# Patient Record
Sex: FEMALE | Race: WHITE | NOT HISPANIC OR LATINO | URBAN - METROPOLITAN AREA
[De-identification: names, ages, dates, MRNs, and addresses within clinical notes are randomized per-mention and may not be internally consistent; named-entity substitution may affect disease eponyms.]

---

## 2018-08-03 ENCOUNTER — CONSULTATION (OUTPATIENT)
Dept: URBAN - METROPOLITAN AREA CLINIC 14 | Facility: CLINIC | Age: 73
End: 2018-08-03

## 2018-08-03 DIAGNOSIS — H35.463: ICD-10-CM

## 2018-08-03 DIAGNOSIS — H04.123: ICD-10-CM

## 2018-08-03 DIAGNOSIS — H43.392: ICD-10-CM

## 2018-08-03 DIAGNOSIS — H35.033: ICD-10-CM

## 2018-08-03 PROCEDURE — 92225 OPHTHALMOSCOPY (INITIAL): CPT

## 2018-08-03 PROCEDURE — G8427 DOCREV CUR MEDS BY ELIG CLIN: HCPCS

## 2018-08-03 PROCEDURE — 1036F TOBACCO NON-USER: CPT

## 2018-08-03 PROCEDURE — 92134 CPTRZ OPH DX IMG PST SGM RTA: CPT

## 2018-08-03 PROCEDURE — 4040F PNEUMOC VAC/ADMIN/RCVD: CPT

## 2018-08-03 PROCEDURE — 99244 OFF/OP CNSLTJ NEW/EST MOD 40: CPT

## 2018-08-03 ASSESSMENT — TONOMETRY
OS_IOP_MMHG: 15
OD_IOP_MMHG: 16

## 2018-08-03 ASSESSMENT — VISUAL ACUITY
OS_SC: 20/30-2
OD_PH: 20/30-1
OD_SC: 20/40
OS_PH: 20/25

## 2021-04-08 DIAGNOSIS — Z23 ENCOUNTER FOR IMMUNIZATION: ICD-10-CM

## 2023-04-03 ENCOUNTER — EVALUATION (OUTPATIENT)
Dept: PHYSICAL THERAPY | Facility: CLINIC | Age: 78
End: 2023-04-03

## 2023-04-03 DIAGNOSIS — M54.31 SCIATIC PAIN, RIGHT: Primary | ICD-10-CM

## 2023-04-03 NOTE — PROGRESS NOTES
PT Evaluation     Today's date: 4/3/2023  Patient name: Janette Upton  : 1945  MRN: [id number]  Referring provider: Crow Mercado MD  Dx:   Encounter Diagnosis     ICD-10-CM    1  Sciatic pain, right  M54 31                      Assessment  Assessment details: Janette Upton is a 70 y o  female with history of lumbar surgery, total knee arthroplasty, and neuropathy in both feet, who presents to hospital-based outpatient PT with chronic pain with radicular symptoms to either LE, right > left  Patient presents with the following impairments: low back pain, right-sided radicular symptoms to the ankle, limited lumbar AROM, limited hip strength, limited core neuromuscular activation, poor posture, and poor balance  Due to these impairments, patient has difficulty performing the following: ADL's, recreational activities, engaging in social activities, ambulation, stair negotiation, lifting/carrying, transfers, self-care activities, prolonged standing, bed mobility, lying prone, squatting, bending forward, household chores, getting dressed, shopping, walking around the house, and walking in the community  Patient has been educated in home exercise program and plan of care  Patient was educated that she may benefit from neurological-based outpatient PT due to recent history of falls and poor balance since lumbar surgery  Patient would benefit from skilled physical therapy services to address the above functional limitations and progress towards prior level of function and independence with home exercise program   Impairments: abnormal gait, abnormal muscle firing, abnormal or restricted ROM, activity intolerance, impaired balance, impaired physical strength, lacks appropriate home exercise program, pain with function, poor posture  and poor body mechanics  Understanding of Dx/Px/POC: good   Prognosis: good    Goals  Short Term Goals (3 weeks; target date: 5/15/23)  1   Patient will be independent with initial "HEP   2  Patient will demonstrate an increase in lumbar extension AROM by 10%  3  Patient will demonstrate an increase in B/L LE strength of 1/2 grade on MMT  Long Term Goals (6 weeks; target date: 7/3/23)  1  Patient will be independent with comprehensive HEP  2  Patient will demonstrate an increase in lumbar AROM to WNL in order to promote self-care activities pain-free  3  Patient will be able to ascend one 8\" step with use of SPC with either LE  4  Patient will be able to lift a 5 lb  object from floor height to waist height with SPC with proper mechanics  5  Patient will be able to ambulate 300 ft on even/uneven surfaces with use of SPC      Plan  Patient would benefit from: skilled physical therapy  Planned modality interventions: cryotherapy, electrical stimulation/Russian stimulation, TENS, ultrasound, thermotherapy: hydrocollator packs, traction, high voltage pulsed current: spasm management and high voltage pulsed current: pain management  Planned therapy interventions: flexibility, functional ROM exercises, graded exercise, home exercise program, joint mobilization, manual therapy, neuromuscular re-education, patient education, strengthening, stretching, therapeutic exercise, therapeutic activities, balance/weight bearing training, gait training, abdominal trunk stabilization, self care, postural training, activity modification, ADL retraining, ADL training, balance, behavior modification, body mechanics training, breathing training, therapeutic training, transfer training, graded activity, graded motor, muscle pump exercises, Contreras taping and IADL retraining  Frequency: 2x week  Duration in weeks: 6  Plan of Care beginning date: 4/3/2023  Plan of Care expiration date: 7/3/2023  Treatment plan discussed with: patient        Subjective Evaluation    History of Present Illness  Mechanism of injury: Pt reports that one month ago, while walking outside of her home, she tripped on a piece of " "concrete that was sticking up  Pt states that she tripped and fell on the left side of her face, which bruised her left eye  Pt denies LOC, bleeding, or any other injury  Pt states that she was previously using a cane prior to her fall  Pt states that two weeks ago, she got caught walking between a table and chair while carrying an object in her home  Pt states that she fell forward onto her knees and onto her right side  Pt states that she sustained two scrapes on her knees  Pt denies hitting her head at this time  Pt states that she saw her PCP and was referred to PT  Pt states that she has a history of back surgery \"during the pandemic\" and is unable to identify the specific surgery that was performed  Pt states that it involved a plate and screws  Pt states that she had to go back a few months later due to the shifting of a plate and a screw had fallen out  Pt attributes her balance problems to this surgery  Pt states that the pain can radiate down the right leg and has radiated to the ankle before  Pt states she has recently had symptoms into the left leg that do not pass the knee     Pain  Current pain ratin  At best pain rating: 3  At worst pain ratin  Location: Right lumbar, right leg   Quality: radiating and sharp  Relieving factors: heat  Aggravating factors: standing, sitting, walking and lifting  Progression: no change    Social Support  Steps to enter house: yes  1  Stairs in house: no   Lives with: alone    Employment status: not working (Retired)  Hand dominance: right  Exercise history: Not much       Diagnostic Tests  No diagnostic tests performed  Treatments  Previous treatment: medication  Patient Goals  Patient goals for therapy: decreased pain  Patient goal: To walk without dependence on a cane or walker         Objective     Neurological Testing  Light Touch Sensation - intact throughout BLE, with the exception of hyposensation in the lateral knee    Lumbar AROM     Flexion  Fingertip " reach to malleoli   Extension 30% with central LBP   L side glide 60% with LBP   R side glide 50% with increased RLE pain   L rotation 60%   R rotation  60%     Repeated motions Position Repetitions performed Symptomatic response during Symptomatic response after   Flexion Standing 10   No effect No effect   Extension Standing 10 Increased LBP No worse     Comments: Pt noted improvement in LBP while performing standing lumbar extension; prone lying attempted, but pt presented with difficulty performing independently    Lower Extremity Strength Testing    Hip MMT  Left  Right   Flexion  4/5 4-/5    Abduction 4/5 4/5   Adduction 4/5 4/5     Knee MMT  Left  Right   Flexion  5/5 5/5    Extension 5/5 5/5     Ankle MMT  Left  Right   Dorsiflexion  4/5 5/5    Plantarflexion 5/5 5/5           Insurance:  AMA/CMS Eval/ Re-eval POC expires FOTO Co-Insurance   CMS - [facility] 4/3/23 7/3/23                                      1   4/3 2   3   4   5   6     7   8   9   10   11   12     13   14   15   16   17   18     19   20  21  22  23  24     25   26  27  28  29  30    31  32   33  34  35  36         Precautions: Hx of lumbar (fusion?) surgery ~2020, hx of L TKA, neuropathy in both feet, FALL RISK       EVAL 2 3 4 5 6   Manuals 4/3/23        STM/MFR         Manual flexibility          Joint mobs          Neuro Re-Ed         TA activation         Hip add iso          Bridging         Clamshells                                    Ther Ex         Prone lying Attempted; unable        DERRELL 10x with RW        Seated sciatic nerve glide 10x B/L                                                      Ther Activity         Pt education POC, HEP        Stairs         Squats         Gait                                    Modalities

## 2023-04-03 NOTE — LETTER
April 3, 2023    Kirk Joshi [address]    Patient: Jessica Loera   YOB: 1945   Date of Visit: 4/3/2023     Encounter Diagnosis     ICD-10-CM    1  Sciatic pain, right  M54 31           Dear Dr Shayla Corral: Thank you for your recent referral of Jessica Loera  Please review the attached evaluation summary from Maria Elena's recent visit  Please verify that you agree with the plan of care by signing the attached order  If you have any questions or concerns, please do not hesitate to call  I sincerely appreciate the opportunity to share in the care of one of your patients and hope to have another opportunity to work with you in the near future  Sincerely,    Stephen Olivo      Referring Provider:      I certify that I have read the below Plan of Care and certify the need for these services furnished under this plan of treatment while under my care  Kirk Joshi MD  [address] [place] [address]  Via Fax: [phone]          PT Evaluation     Today's date: 4/3/2023  Patient name: Jessica Loera  : 1945  MRN: [id number]  Referring provider: Jennifer Dahl MD  Dx:   Encounter Diagnosis     ICD-10-CM    1  Sciatic pain, right  M54 31                      Assessment  Assessment details: Jessica Loera is a 70 y o  female with history of lumbar surgery, total knee arthroplasty, and neuropathy in both feet, who presents to hospital-based outpatient PT with chronic pain with radicular symptoms to either LE, right > left  Patient presents with the following impairments: low back pain, right-sided radicular symptoms to the ankle, limited lumbar AROM, limited hip strength, limited core neuromuscular activation, poor posture, and poor balance   Due to these impairments, patient has difficulty performing the following: ADL's, recreational activities, engaging in social activities, ambulation, stair negotiation, lifting/carrying, transfers, "self-care activities, prolonged standing, bed mobility, lying prone, squatting, bending forward, household chores, getting dressed, shopping, walking around the house, and walking in the community  Patient has been educated in home exercise program and plan of care  Patient was educated that she may benefit from neurological-based outpatient PT due to recent history of falls and poor balance since lumbar surgery  Patient would benefit from skilled physical therapy services to address the above functional limitations and progress towards prior level of function and independence with home exercise program   Impairments: abnormal gait, abnormal muscle firing, abnormal or restricted ROM, activity intolerance, impaired balance, impaired physical strength, lacks appropriate home exercise program, pain with function, poor posture  and poor body mechanics  Understanding of Dx/Px/POC: good   Prognosis: good    Goals  Short Term Goals (3 weeks; target date: 5/15/23)  1  Patient will be independent with initial HEP  2  Patient will demonstrate an increase in lumbar extension AROM by 10%  3  Patient will demonstrate an increase in B/L LE strength of 1/2 grade on MMT  Long Term Goals (6 weeks; target date: 7/3/23)  1  Patient will be independent with comprehensive HEP  2  Patient will demonstrate an increase in lumbar AROM to WNL in order to promote self-care activities pain-free  3  Patient will be able to ascend one 8\" step with use of SPC with either LE  4  Patient will be able to lift a 5 lb  object from floor height to waist height with SPC with proper mechanics  5  Patient will be able to ambulate 300 ft on even/uneven surfaces with use of SPC      Plan  Patient would benefit from: skilled physical therapy  Planned modality interventions: cryotherapy, electrical stimulation/Russian stimulation, TENS, ultrasound, thermotherapy: hydrocollator packs, traction, high voltage pulsed current: spasm management and high " "voltage pulsed current: pain management  Planned therapy interventions: flexibility, functional ROM exercises, graded exercise, home exercise program, joint mobilization, manual therapy, neuromuscular re-education, patient education, strengthening, stretching, therapeutic exercise, therapeutic activities, balance/weight bearing training, gait training, abdominal trunk stabilization, self care, postural training, activity modification, ADL retraining, ADL training, balance, behavior modification, body mechanics training, breathing training, therapeutic training, transfer training, graded activity, graded motor, muscle pump exercises, Contreras taping and IADL retraining  Frequency: 2x week  Duration in weeks: 6  Plan of Care beginning date: 4/3/2023  Plan of Care expiration date: 7/3/2023  Treatment plan discussed with: patient        Subjective Evaluation    History of Present Illness  Mechanism of injury: Pt reports that one month ago, while walking outside of her home, she tripped on a piece of concrete that was sticking up  Pt states that she tripped and fell on the left side of her face, which bruised her left eye  Pt denies LOC, bleeding, or any other injury  Pt states that she was previously using a cane prior to her fall  Pt states that two weeks ago, she got caught walking between a table and chair while carrying an object in her home  Pt states that she fell forward onto her knees and onto her right side  Pt states that she sustained two scrapes on her knees  Pt denies hitting her head at this time  Pt states that she saw her PCP and was referred to PT  Pt states that she has a history of back surgery \"during the pandemic\" and is unable to identify the specific surgery that was performed  Pt states that it involved a plate and screws  Pt states that she had to go back a few months later due to the shifting of a plate and a screw had fallen out  Pt attributes her balance problems to this surgery   Pt states " that the pain can radiate down the right leg and has radiated to the ankle before  Pt states she has recently had symptoms into the left leg that do not pass the knee     Pain  Current pain ratin  At best pain rating: 3  At worst pain ratin  Location: Right lumbar, right leg   Quality: radiating and sharp  Relieving factors: heat  Aggravating factors: standing, sitting, walking and lifting  Progression: no change    Social Support  Steps to enter house: yes  1  Stairs in house: no   Lives with: alone    Employment status: not working (Retired)  Hand dominance: right  Exercise history: Not much       Diagnostic Tests  No diagnostic tests performed  Treatments  Previous treatment: medication  Patient Goals  Patient goals for therapy: decreased pain  Patient goal: To walk without dependence on a cane or walker         Objective     Neurological Testing  Light Touch Sensation - intact throughout BLE, with the exception of hyposensation in the lateral knee    Lumbar AROM     Flexion  Fingertip reach to malleoli   Extension 30% with central LBP   L side glide 60% with LBP   R side glide 50% with increased RLE pain   L rotation 60%   R rotation  60%     Repeated motions Position Repetitions performed Symptomatic response during Symptomatic response after   Flexion Standing 10   No effect No effect   Extension Standing 10 Increased LBP No worse     Comments: Pt noted improvement in LBP while performing standing lumbar extension; prone lying attempted, but pt presented with difficulty performing independently    Lower Extremity Strength Testing    Hip MMT  Left  Right   Flexion  4/5 4-/5    Abduction 4/5 4/5   Adduction 4/5 4/5     Knee MMT  Left  Right   Flexion  5/5 5/5    Extension 5/5 5/5     Ankle MMT  Left  Right   Dorsiflexion  4/5 5/5    Plantarflexion 5/5 5/5          Insurance:  AMA/CMS Eval/ Re-eval POC expires FOTO Co-Insurance   CMS - [facility] 4/3/23 7/3/23                                      1   4/3 2   3  4   5   6     7   8   9   10   11   12     13   14   15   16   17   18     19   20  21  22  23  24     25   26  27  28  29  30    31  32   33  34  35  36         Precautions: Hx of lumbar (fusion?) surgery ~2020, hx of L TKA, neuropathy in both feet, FALL RISK       EVAL 2 3 4 5 6   Manuals 4/3/23        STM/MFR         Manual flexibility          Joint mobs          Neuro Re-Ed         TA activation         Hip add iso          Bridging         Clamshells                                    Ther Ex         Prone lying Attempted; unable        DERRELL 10x with RW                                                              Ther Activity         Pt education POC, HEP        Stairs         Squats         Gait                                    Modalities

## 2023-04-05 ENCOUNTER — OFFICE VISIT (OUTPATIENT)
Dept: PHYSICAL THERAPY | Facility: CLINIC | Age: 78
End: 2023-04-05

## 2023-04-05 DIAGNOSIS — M54.31 SCIATIC PAIN, RIGHT: Primary | ICD-10-CM

## 2023-04-05 NOTE — PROGRESS NOTES
Daily Note      Today's date: 2023  Patient name: Loretta Vizcarra  : 1945  MRN: [id number]  Referring provider: Alfredo Curry MD  Dx:   Encounter Diagnosis     ICD-10-CM    1  Sciatic pain, right  M54 31           Subjective: Pt reports that her sciatic symptoms are a little better compared to first day of PT  Pt states that she is able to perform her exercises without any issues  Objective: See treatment diary below    Assessment: Pt tolerated treatment well  Pt demonstrated good carryover of initial HEP  Pt performed DERRELL holding onto RW and plinth behind  Pt verbalized limited confidence with backward bending due to balance  Pt was able to perform rows with RW in front without any issue, but noted lightheadedness after this exercise  Pt demonstrated the ability to consistently contract TA and incorporated it into other hip strengthening exercises well  Plan: Continue per plan of care  Progress treatment as tolerated            Insurance:  AMA/CMS Eval/ Re-eval POC expires FOTO Co-Insurance   CMS - [facility] 4/3/23 7/3/23                                      1   4/3 2     3   4   5   6     7   8   9   10   11   12     13   14   15   16   17   18     19   20  21  22  23  24     25   26  27  28  29  30    31  32   33  34  35  36         Precautions: Hx of lumbar (fusion?) surgery ~, hx of L TKA, neuropathy in both feet, colostomy, FALL RISK       EVAL 2 3 4 5 6   Manuals 4/3/23 4/5/23       STM/MFR         Manual flexibility          Joint mobs          Neuro Re-Ed         TA activation  2x10 (3s) w/ self-palpation       Hip add iso   2x10 (3s)        Glute sets  2x10 (3s)        Clamshells  Supine 20x GTB       Supine march   15x B/L        Rows  W/ RW in front 2x10 4#                Ther Ex         Prone lying Attempted; unable        DERRELL 10x with RW 10x w/ RW       Seated sciatic nerve glide 10x B/L  20x B/L        HS stretching  3x10s B/L w/ SOS Ther Activity         Pt education POC, HEP Reviewed HEP       Stairs         Squats         Gait                                    Modalities

## 2023-04-24 ENCOUNTER — OFFICE VISIT (OUTPATIENT)
Dept: PHYSICAL THERAPY | Facility: CLINIC | Age: 78
End: 2023-04-24

## 2023-04-24 DIAGNOSIS — M54.31 SCIATIC PAIN, RIGHT: Primary | ICD-10-CM

## 2023-04-24 NOTE — PROGRESS NOTES
Daily Note      Today's date: 2023  Patient name: Pranay Tsai  : 1945  MRN: [id number]  Referring provider: Blessing Jimenez MD  Dx:   Encounter Diagnosis     ICD-10-CM    1  Sciatic pain, right  M54 31           Subjective: Pt reports that she is feeling better after being sick last week, but her back has not improved  Pt states that she feels a little better after taking muscle relaxants, prescribed by her PCP  Pt states that she had an x-ray taken and has not yet discussed results with her PCP as he is away  Pt states that she was read the report stating that there is arthritis throughout her back  Pt states that she has not yet followed up with her PCP in the office yet and is waiting for him to return  Objective: See treatment diary below    Assessment: Pt tolerated treatment fair  Pt continues to note pain and difficulty with supine to sit and sit to supine, requiring minor assist from PT  Pt trialed DKTC and LTR and noted difficulty with this exercise, without change in low back pain or sciatic symptoms  Pt also introduced to standing hip abduction and extension and required verbal cuing in order to decrease trunk compensation  Pt presented with severe tissue tension on R QL and lumbar paraspinals and slightly less tissue tension on L  Pt noted TTP on the R side and no TTP on the L  Educated pt that due to missed appointments, ability to assess progress has been limited at this time  Plan to continue PT over the next 2 weeks and reassessment in 1-2 weeks  Plan: Continue per plan of care  Progress treatment as tolerated            Insurance:  AMA/CMS Eval/ Re-eval POC expires FOTO Co-Insurance   CMS - [facility] 4/3/23 7/3/23                                      1   4/3 2     3    4/10 4     5     6     7   8   9   10   11   12     13   14   15   16   17   18     19   20  21  22  23  24     25   26  27  28  29  30    31  32   33  34  35  36          Precautions: Hx of lumbar (fusion?) surgery ~2020, hx of L TKA, neuropathy in both feet, colostomy, FALL RISK       EVAL 2 3 4 5 6   Manuals 4/3/23 4/5/23 4/10/23 4/12/23 4/24/23    STM/MFR     B/L QL, lumbar paraspinals    Manual flexibility          Joint mobs          Neuro Re-Ed         TA activation  2x10 (3s) w/ self-palpation       Hip add iso   2x10 (3s)        Glute sets  2x10 (3s)        Clamshells  Supine 20x GTB   Standing hip abd and ext 10x ea   B/L     Supine march   15x B/L        Rows  W/ RW in front 2x10 4#                Ther Ex         Prone lying Attempted; unable        DERRELL 10x with RW 10x w/ RW 10x w/ RW 10x w/ RW  10x in front of plinth    Seated sciatic nerve glide 10x B/L  20x B/L  20x B/L  Reviewed     HS stretching  3x10s B/L w/ SOS 3x10s B/L w/ SOS      DKTC     2x10 (5s)     LTR     15x B/L                       Ther Activity         Pt education POC, HEP Reviewed HEP Interrupting prolonged sitting with standing and DERRELL Communication with MD Reviewed POC     Stairs         Squats         Gait                                    Modalities         MHP lumbar   Supine 10' pre-tx  Supine 10' post-tx Supine 10' total, 5 min during exercise    Lumbar mechanical traction    10' intermittent supine 30-70#

## 2023-04-26 ENCOUNTER — APPOINTMENT (OUTPATIENT)
Dept: PHYSICAL THERAPY | Facility: CLINIC | Age: 78
End: 2023-04-26

## 2023-05-02 ENCOUNTER — EVALUATION (OUTPATIENT)
Dept: PHYSICAL THERAPY | Facility: CLINIC | Age: 78
End: 2023-05-02

## 2023-05-02 DIAGNOSIS — M54.31 SCIATIC PAIN, RIGHT: Primary | ICD-10-CM

## 2023-05-02 NOTE — PROGRESS NOTES
PT Re-Evaluation     Today's date: 2023  Patient name: Marcia James  : 1945  MRN: [id number]  Referring provider: Jazmyn Griffith MD  Dx:   Encounter Diagnosis     ICD-10-CM    1  Sciatic pain, right  M54 31                      Assessment  Assessment details: Marcia James has been seen for 6 visits in hospital-based outpatient PT with chronic low back pain with radicular symptoms to either LE, left more often than right recently  Patient also has a history of lumbar surgery in 2019 in which hardware was placed and a second surgery a few days later after hardware had loosened  Patient demonstrates limited progress toward short-term and long-term physical therapy goals  Patient continues to present with the following impairments: low back pain, radicular symptoms to either LE, limited lumbar AROM, limited hip strength, limited core neuromuscular activation, poor posture, and poor balance  Due to these impairments, patient continues to have difficulty performing the following: ADL's, recreational activities, engaging in social activities, ambulation, stair negotiation, lifting/carrying, transfers, self-care activities, prolonged standing, bed mobility, lying prone, squatting, bending forward, household chores, getting dressed, shopping, walking around the house, and walking in the community  Patient has been educated in updated plan of care  Patient would benefit from continued skilled physical therapy services in order to establish comprehensive HEP  Patient was advised to establish care with orthopedic/spine specialist as physical therapy will be held after next visit due to limited progress to date    Impairments: abnormal gait, abnormal muscle firing, abnormal or restricted ROM, activity intolerance, impaired balance, impaired physical strength, lacks appropriate home exercise program, pain with function, poor posture  and poor body mechanics  Understanding of Dx/Px/POC: good   Prognosis: "good    Goals  Short Term Goals (3 weeks; target date: 5/15/23)  1  Patient will be independent with initial HEP  - GOAL MET  2  Patient will demonstrate an increase in lumbar extension AROM by 10%  - GOAL MET  3  Patient will demonstrate an increase in B/L LE strength of 1/2 grade on MMT  - in progress     Long Term Goals (6 weeks; target date: 7/3/23)  1  Patient will be independent with comprehensive HEP  - in progress   2  Patient will demonstrate an increase in lumbar AROM to WNL in order to promote self-care activities pain-free  - in progress   3  Patient will be able to ascend one 8\" step with use of SPC with either LE  - in progress   4  Patient will be able to lift a 5 lb  object from floor height to waist height with SPC with proper mechanics  - in progress   5   Patient will be able to ambulate 300 ft on even/uneven surfaces with use of SPC  - in progress     Plan  Patient would benefit from: skilled physical therapy  Planned modality interventions: cryotherapy, electrical stimulation/Russian stimulation, TENS, ultrasound, thermotherapy: hydrocollator packs, traction, high voltage pulsed current: spasm management and high voltage pulsed current: pain management  Planned therapy interventions: flexibility, functional ROM exercises, graded exercise, home exercise program, joint mobilization, manual therapy, neuromuscular re-education, patient education, strengthening, stretching, therapeutic exercise, therapeutic activities, balance/weight bearing training, gait training, abdominal trunk stabilization, self care, postural training, activity modification, ADL retraining, ADL training, balance, behavior modification, body mechanics training, breathing training, therapeutic training, transfer training, graded activity, graded motor, muscle pump exercises, Contreras taping and IADL retraining  Frequency: 2x week  Duration in weeks: 2  Plan of Care beginning date: 5/2/2023  Plan of Care expiration date: " "7/3/2023  Treatment plan discussed with: patient        Subjective Evaluation    History of Present Illness  Mechanism of injury: Pt reports that one month ago, while walking outside of her home, she tripped on a piece of concrete that was sticking up  Pt states that she tripped and fell on the left side of her face, which bruised her left eye  Pt denies LOC, bleeding, or any other injury  Pt states that she was previously using a cane prior to her fall  Pt states that two weeks ago, she got caught walking between a table and chair while carrying an object in her home  Pt states that she fell forward onto her knees and onto her right side  Pt states that she sustained two scrapes on her knees  Pt denies hitting her head at this time  Pt states that she saw her PCP and was referred to PT  Pt states that she has a history of back surgery \"during the pandemic\" and is unable to identify the specific surgery that was performed  Pt states that it involved a plate and screws  Pt states that she had to go back a few months later due to the shifting of a plate and a screw had fallen out  Pt attributes her balance problems to this surgery  Pt states that the pain can radiate down the right leg and has radiated to the ankle before  Pt states she has recently had symptoms into the left leg that do not pass the knee     (23) Pt reports minimal progress since starting PT  Pt states that she still has difficulty with walking and with carrying objects  Pt states that she still has pain radiating into the right leg, but the left leg is more of a problem now  Pt states that she has to use the walker rather than a cane     Pain  Current pain ratin  At best pain ratin  At worst pain rating: 10  Location: Right lumbar, right leg   Quality: radiating, sharp and throbbing  Relieving factors: heat  Aggravating factors: standing, sitting, walking and lifting  Progression: no change    Social Support  Steps to enter house: " yes  1  Stairs in house: no   Lives with: alone    Employment status: not working (Retired)  Hand dominance: right  Exercise history: Not much       Diagnostic Tests  No diagnostic tests performed  Treatments  Previous treatment: medication  Patient Goals  Patient goals for therapy: decreased pain  Patient goal: To walk without dependence on a cane or walker         Objective     Neurological Testing  Light Touch Sensation - intact throughout BLE, with the exception of hyposensation in the lateral knee    Lumbar AROM     Flexion  Fingertip reach to mid shins with L LBP    Extension 30% with L LBP   L side glide Unable *L LBP   R side glide Unable *L LBP   L rotation Unable   R rotation Unable     Repeated motions Position Repetitions performed Symptomatic response during Symptomatic response after   Flexion Standing 10   Increases L LBP Worse   Extension Standing 10 Increases L LBP Worse     Comments: Pt noted increased L LBP with returning to start from flexed position; pt notes increased pain from attempting to side glide and was unable to perform all other motions after     Lower Extremity Strength Testing    Hip MMT  Left  Right   Flexion  3+/5 4/5    Abduction 4/5 4/5   Adduction 4/5 4/5     Knee MMT  Left  Right   Flexion  5/5 5/5    Extension 5/5 5/5     Ankle MMT  Left  Right   Dorsiflexion  3+5 5/5    Plantarflexion 5/5 5/5                  Insurance:  AMA/CMS Eval/ Re-eval POC expires FOTO Co-Insurance   CMS - [facility] 4/3/23 7/3/23                                      1   4/3 2    4/5 3    4/10 4    4/12 5    4/24 6     5/2 RE   7   8   9   10   11   12     13   14   15   16   17   18     19   20  21  22  23  24     25   26  27  28  29  30    31  32   33  34  35  36          Precautions: Hx of lumbar (fusion?) surgery ~2020, hx of L TKA, neuropathy in both feet, colostomy, FALL RISK       3 4 5 6 (RE-EVAL) 7    Manuals 4/10/23 4/12/23 4/24/23 5/2/23     STM/MFR   B/L QL, lumbar paraspinals B/L QL, lumbar paraspinals while seated     Manual flexibility          Joint mobs          Neuro Re-Ed         TA activation         Hip add iso          Glute sets         Clamshells   Standing hip abd and ext 10x ea   B/L       Supine march          Rows                  Ther Ex         Prone lying    Reassessment of ROM and strength x 30'      DERRELL 10x w/ RW 10x w/ RW  10x in front of plinth      Seated sciatic nerve glide 20x B/L  Reviewed       HS stretching 3x10s B/L w/ SOS        DKTC   2x10 (5s)       LTR   15x B/L                         Ther Activity         Pt education Interrupting prolonged sitting with standing and DERRELL Communication with MD Reviewed POC  Reviewed POC; recommended establishing care with orthopedics/ spine specialist     Stairs         Squats         Gait                                    Modalities         MHP lumbar Supine 10' pre-tx  Supine 10' post-tx Supine 10' total, 5 min during exercise Seated 5 min pre-tx     Lumbar mechanical traction  10' intermittent supine 30-70#

## 2023-05-02 NOTE — LETTER
May 2, 2023    Rich Reynolds [address]    Patient: Geoffrey Quinn   YOB: 1945   Date of Visit: 2023     Encounter Diagnosis     ICD-10-CM    1  Sciatic pain, right  M54 31           Dear Dr Chino Hiss: Thank you for your recent referral of Geoffrey Quinn  Please review the attached evaluation summary from Maria Elena's recent visit  Please verify that you agree with the plan of care by signing the attached order  If you have any questions or concerns, please do not hesitate to call  I sincerely appreciate the opportunity to share in the care of one of your patients and hope to have another opportunity to work with you in the near future  Sincerely,    Rohit Worrell      Referring Provider:      I certify that I have read the below Plan of Care and certify the need for these services furnished under this plan of treatment while under my care  Rich Reynolds MD  [address] [place] [address]  Via Fax: [phone]          PT Re-Evaluation     Today's date: 2023  Patient name: Geoffrey Quinn  : 1945  MRN: [id number]  Referring provider: Supa Huntley MD  Dx:   Encounter Diagnosis     ICD-10-CM    1  Sciatic pain, right  M54 31                      Assessment  Assessment details: Geoffrey Quinn has been seen for 6 visits in hospital-based outpatient PT with chronic low back pain with radicular symptoms to either LE, left more often than right recently  Patient also has a history of lumbar surgery in 2019 in which hardware was placed and a second surgery a few days later after hardware had loosened  Patient demonstrates limited progress toward short-term and long-term physical therapy goals  Patient continues to present with the following impairments: low back pain, radicular symptoms to either LE, limited lumbar AROM, limited hip strength, limited core neuromuscular activation, poor posture, and poor balance   Due to these "impairments, patient continues to have difficulty performing the following: ADL's, recreational activities, engaging in social activities, ambulation, stair negotiation, lifting/carrying, transfers, self-care activities, prolonged standing, bed mobility, lying prone, squatting, bending forward, household chores, getting dressed, shopping, walking around the house, and walking in the community  Patient has been educated in updated plan of care  Patient would benefit from continued skilled physical therapy services in order to establish comprehensive HEP  Patient was advised to establish care with orthopedic/spine specialist as physical therapy will be held after next visit due to limited progress to date  Impairments: abnormal gait, abnormal muscle firing, abnormal or restricted ROM, activity intolerance, impaired balance, impaired physical strength, lacks appropriate home exercise program, pain with function, poor posture  and poor body mechanics  Understanding of Dx/Px/POC: good   Prognosis: good    Goals  Short Term Goals (3 weeks; target date: 5/15/23)  1  Patient will be independent with initial HEP  - GOAL MET  2  Patient will demonstrate an increase in lumbar extension AROM by 10%  - GOAL MET  3  Patient will demonstrate an increase in B/L LE strength of 1/2 grade on MMT  - in progress     Long Term Goals (6 weeks; target date: 7/3/23)  1  Patient will be independent with comprehensive HEP  - in progress   2  Patient will demonstrate an increase in lumbar AROM to WNL in order to promote self-care activities pain-free  - in progress   3  Patient will be able to ascend one 8\" step with use of SPC with either LE  - in progress   4  Patient will be able to lift a 5 lb  object from floor height to waist height with SPC with proper mechanics  - in progress   5   Patient will be able to ambulate 300 ft on even/uneven surfaces with use of SPC  - in progress     Plan  Patient would benefit from: skilled physical " "therapy  Planned modality interventions: cryotherapy, electrical stimulation/Russian stimulation, TENS, ultrasound, thermotherapy: hydrocollator packs, traction, high voltage pulsed current: spasm management and high voltage pulsed current: pain management  Planned therapy interventions: flexibility, functional ROM exercises, graded exercise, home exercise program, joint mobilization, manual therapy, neuromuscular re-education, patient education, strengthening, stretching, therapeutic exercise, therapeutic activities, balance/weight bearing training, gait training, abdominal trunk stabilization, self care, postural training, activity modification, ADL retraining, ADL training, balance, behavior modification, body mechanics training, breathing training, therapeutic training, transfer training, graded activity, graded motor, muscle pump exercises, Contreras taping and IADL retraining  Frequency: 2x week  Duration in weeks: 2  Plan of Care beginning date: 5/2/2023  Plan of Care expiration date: 7/3/2023  Treatment plan discussed with: patient        Subjective Evaluation    History of Present Illness  Mechanism of injury: Pt reports that one month ago, while walking outside of her home, she tripped on a piece of concrete that was sticking up  Pt states that she tripped and fell on the left side of her face, which bruised her left eye  Pt denies LOC, bleeding, or any other injury  Pt states that she was previously using a cane prior to her fall  Pt states that two weeks ago, she got caught walking between a table and chair while carrying an object in her home  Pt states that she fell forward onto her knees and onto her right side  Pt states that she sustained two scrapes on her knees  Pt denies hitting her head at this time  Pt states that she saw her PCP and was referred to PT  Pt states that she has a history of back surgery \"during the pandemic\" and is unable to identify the specific surgery that was performed   Pt " states that it involved a plate and screws  Pt states that she had to go back a few months later due to the shifting of a plate and a screw had fallen out  Pt attributes her balance problems to this surgery  Pt states that the pain can radiate down the right leg and has radiated to the ankle before  Pt states she has recently had symptoms into the left leg that do not pass the knee     (23) Pt reports minimal progress since starting PT  Pt states that she still has difficulty with walking and with carrying objects  Pt states that she still has pain radiating into the right leg, but the left leg is more of a problem now  Pt states that she has to use the walker rather than a cane     Pain  Current pain ratin  At best pain ratin  At worst pain rating: 10  Location: Right lumbar, right leg   Quality: radiating, sharp and throbbing  Relieving factors: heat  Aggravating factors: standing, sitting, walking and lifting  Progression: no change    Social Support  Steps to enter house: yes  1  Stairs in house: no   Lives with: alone    Employment status: not working (Retired)  Hand dominance: right  Exercise history: Not much       Diagnostic Tests  No diagnostic tests performed  Treatments  Previous treatment: medication  Patient Goals  Patient goals for therapy: decreased pain  Patient goal: To walk without dependence on a cane or walker         Objective     Neurological Testing  Light Touch Sensation - intact throughout BLE, with the exception of hyposensation in the lateral knee    Lumbar AROM     Flexion  Fingertip reach to mid shins with L LBP    Extension 30% with L LBP   L side glide Unable *L LBP   R side glide Unable *L LBP   L rotation Unable   R rotation Unable     Repeated motions Position Repetitions performed Symptomatic response during Symptomatic response after   Flexion Standing 10   Increases L LBP Worse   Extension Standing 10 Increases L LBP Worse     Comments: Pt noted increased L LBP with returning to start from flexed position; pt notes increased pain from attempting to side glide and was unable to perform all other motions after     Lower Extremity Strength Testing    Hip MMT  Left  Right   Flexion  3+/5 4/5    Abduction 4/5 4/5   Adduction 4/5 4/5     Knee MMT  Left  Right   Flexion  5/5 5/5    Extension 5/5 5/5     Ankle MMT  Left  Right   Dorsiflexion  3+5 5/5    Plantarflexion 5/5 5/5                 Insurance:  AMA/CMS Eval/ Re-eval POC expires FOTO Co-Insurance   CMS - [facility] 4/3/23 7/3/23                                      1   4/3 2    4/5 3    4/10 4    4/12 5    4/24 6  5/2 RE   7   8   9   10   11   12     13   14   15   16   17   18     19   20  21  22  23  24     25   26  27  28  29  30    31  32   33  34  35  36          Precautions: Hx of lumbar (fusion?) surgery ~2020, hx of L TKA, neuropathy in both feet, colostomy, FALL RISK       3 4 5 6 (RE-EVAL) 7    Manuals 4/10/23 4/12/23 4/24/23 5/2/23     STM/MFR   B/L QL, lumbar paraspinals B/L QL, lumbar paraspinals while seated     Manual flexibility          Joint mobs          Neuro Re-Ed         TA activation         Hip add iso          Glute sets         Clamshells   Standing hip abd and ext 10x ea   B/L       Supine march Rows                  Ther Ex         Prone lying    Reassessment of ROM and strength x 30'      DERRELL 10x w/ RW 10x w/ RW  10x in front of plinth      Seated sciatic nerve glide 20x B/L  Reviewed       HS stretching 3x10s B/L w/ SOS        DKTC   2x10 (5s)       LTR   15x B/L                         Ther Activity         Pt education Interrupting prolonged sitting with standing and DERRELL Communication with MD Reviewed POC  Reviewed POC; recommended establishing care with orthopedics/ spine specialist     Stairs         Squats         Gait                                    Modalities         MHP lumbar Supine 10' pre-tx  Supine 10' post-tx Supine 10' total, 5 min during exercise Seated 5 min pre-tx Lumbar mechanical traction  10' intermittent supine 30-70#

## 2023-05-18 ENCOUNTER — CONSULT (OUTPATIENT)
Dept: PAIN MEDICINE | Facility: CLINIC | Age: 78
End: 2023-05-18

## 2023-05-18 ENCOUNTER — TELEPHONE (OUTPATIENT)
Dept: PAIN MEDICINE | Facility: CLINIC | Age: 78
End: 2023-05-18

## 2023-05-18 VITALS
HEART RATE: 95 BPM | TEMPERATURE: 98 F | DIASTOLIC BLOOD PRESSURE: 75 MMHG | WEIGHT: 186 LBS | SYSTOLIC BLOOD PRESSURE: 146 MMHG

## 2023-05-18 DIAGNOSIS — M46.1 SACROILIITIS (HCC): ICD-10-CM

## 2023-05-18 DIAGNOSIS — M54.41 CHRONIC BILATERAL LOW BACK PAIN WITH BILATERAL SCIATICA: ICD-10-CM

## 2023-05-18 DIAGNOSIS — G89.4 CHRONIC PAIN SYNDROME: Primary | ICD-10-CM

## 2023-05-18 DIAGNOSIS — G89.29 CHRONIC BILATERAL LOW BACK PAIN WITH BILATERAL SCIATICA: ICD-10-CM

## 2023-05-18 DIAGNOSIS — M54.42 CHRONIC BILATERAL LOW BACK PAIN WITH BILATERAL SCIATICA: ICD-10-CM

## 2023-05-18 RX ORDER — CARVEDILOL 12.5 MG/1
12.5 TABLET ORAL 2 TIMES DAILY WITH MEALS
COMMUNITY

## 2023-05-18 RX ORDER — MIRABEGRON 50 MG/1
25 TABLET, FILM COATED, EXTENDED RELEASE ORAL
COMMUNITY
Start: 2023-05-11

## 2023-05-18 RX ORDER — MULTIVITAMIN
1 TABLET ORAL DAILY
COMMUNITY

## 2023-05-18 RX ORDER — MELOXICAM 7.5 MG/1
TABLET ORAL
COMMUNITY
Start: 2023-04-26

## 2023-05-18 RX ORDER — ZOLPIDEM TARTRATE 5 MG/1
TABLET ORAL
COMMUNITY
Start: 2023-03-31

## 2023-05-18 RX ORDER — GABAPENTIN 300 MG/1
CAPSULE ORAL
COMMUNITY
Start: 2023-04-01

## 2023-05-18 RX ORDER — TRAMADOL HYDROCHLORIDE 50 MG/1
TABLET ORAL
COMMUNITY
Start: 2023-05-11

## 2023-05-18 RX ORDER — IRBESARTAN 150 MG/1
TABLET ORAL
COMMUNITY
Start: 2023-04-26

## 2023-05-18 RX ORDER — PANTOPRAZOLE SODIUM 40 MG/1
TABLET, DELAYED RELEASE ORAL
COMMUNITY
Start: 2023-04-26

## 2023-05-18 NOTE — PROGRESS NOTES
Assessment:  1  Chronic pain syndrome    2  Chronic bilateral low back pain with bilateral sciatica    3  Sacroiliitis (HCC)        Plan  New Medications Ordered This Visit   Medications   • prednisoLONE 5 MG (21) TBPK   • meloxicam (MOBIC) 7 5 mg tablet   • traMADol (ULTRAM) 50 mg tablet   • pantoprazole (PROTONIX) 40 mg tablet   • Myrbetriq 50 MG TB24   • gabapentin (NEURONTIN) 300 mg capsule   • irbesartan (AVAPRO) 150 mg tablet   • zolpidem (AMBIEN) 5 mg tablet   • carvedilol (COREG) 12 5 mg tablet     Sig: Take 12 5 mg by mouth 2 (two) times a day with meals   • Multiple Vitamin (multivitamin) tablet     Sig: Take 1 tablet by mouth daily     My impressions and treatment recommendations were discussed in detail with the patient, who verbalized understanding and had no further questions  Given that the patient has signs and symptoms consistent with bilateral sacroiliitis, discussed the rationale of undergoing bilateral sacroiliac joint injections since this could be potentially therapeutic  The procedures, its risks, and benefits were explained in detail to the patient  Risks include but are not limited to bleeding, infection, hematoma formation, abscess formation, weakness, headache, failure the pain to improve, nerve irritation or damage, and potential worsening of the pain  The patient verbalized understanding and wished to proceed with the procedure  Follow-up is planned in 4 weeks time or sooner as warranted  Discharge instructions were provided  I personally saw and examined the patient and I agree with the above discussed plan of care  History of Present Illness:    Jennifer Westfall is a 70 y o  female who presents to [facility] for initial evaluation of the above stated pain complaints  The patient has a past medical and chronic pain history as outlined in the assessment section  She was self-referred      At today's office visit, the patient reports pain primarily overlying the bilateral sacroiliac joints  She describes her pain as severe and 10 out of 10 on the verbal numerical pain rating scale  Her pain is constant in nature  She reports a 3-month history of significant pain in overlying her bilateral sacroiliac joints with radiation in the distribution of the sciatic nerve bilaterally  She reports no typical pattern to her pain  She describes her pain as shooting, numbness, sharp, pressure-like, throbbing, and dull/aching  She reports weakness in her lower extremities  She ambulates with the assistance of a walker  Prayer, lying down, standing, bending, sitting, exercise, relaxation, coughing, sneezing, bowel movements, and menstruation increases pain  There are no pain relieving factors  Physical therapy and heat treatment provides no pain relief  Surgery did provide her moderate pain relief  Tramadol, acetaminophen, and Advil is currently being used  Review of Systems:    Review of Systems   Constitutional: Negative for chills and fatigue  HENT: Negative for ear pain, mouth sores and sinus pressure  Eyes: Negative for pain, redness and visual disturbance  Respiratory: Negative for shortness of breath and wheezing  Cardiovascular: Negative for chest pain and palpitations  Gastrointestinal: Negative for abdominal pain and nausea  Endocrine: Negative for polyphagia  Musculoskeletal: Positive for back pain, gait problem and joint swelling  Negative for arthralgias and neck pain  Decreased ROM   Skin: Negative for wound  Neurological: Positive for dizziness, weakness, light-headedness and numbness  Negative for seizures  Psychiatric/Behavioral: Negative for dysphoric mood and sleep disturbance             Past Medical History:   Diagnosis Date   • Arthritis    • Fibromyalgia, primary        Past Surgical History:   Procedure Laterality Date   • COLON SURGERY     • SPINAL FUSION     • SPINE SURGERY         Family History   Problem Relation Age of Onset   • No Known Problems Mother    • No Known Problems Father        Social History     Occupational History   • Not on file   Tobacco Use   • Smoking status: Never   • Smokeless tobacco: Never   Substance and Sexual Activity   • Alcohol use: Never   • Drug use: Never   • Sexual activity: Never         Current Outpatient Medications:   •  carvedilol (COREG) 12 5 mg tablet, Take 12 5 mg by mouth 2 (two) times a day with meals, Disp: , Rfl:   •  gabapentin (NEURONTIN) 300 mg capsule, , Disp: , Rfl:   •  irbesartan (AVAPRO) 150 mg tablet, , Disp: , Rfl:   •  meloxicam (MOBIC) 7 5 mg tablet, , Disp: , Rfl:   •  Multiple Vitamin (multivitamin) tablet, Take 1 tablet by mouth daily, Disp: , Rfl:   •  Myrbetriq 50 MG TB24, , Disp: , Rfl:   •  pantoprazole (PROTONIX) 40 mg tablet, , Disp: , Rfl:   •  prednisoLONE 5 MG (21) TBPK, , Disp: , Rfl:   •  traMADol (ULTRAM) 50 mg tablet, , Disp: , Rfl:   •  zolpidem (AMBIEN) 5 mg tablet, , Disp: , Rfl:     No Known Allergies    Physical Exam:    /75   Pulse 95   Temp 98 °F (36 7 °C)   Wt 84 4 kg (186 lb)     Constitutional: obese  Eyes: anicteric  HEENT: grossly intact  Neck: supple, symmetric, trachea midline and no masses   Pulmonary:even and unlabored  Cardiovascular:No edema or pitting edema present  Skin:Normal without rashes or lesions and well hydrated  Psychiatric:Mood and affect appropriate  Neurologic:Cranial Nerves II-XII grossly intact  Musculoskeletal:antalgic and ambulates with wheeled walker     Lumbar Spine Exam    Appearance:  Normal lordosis  Palpation/Tenderness:  left sacroiliac joint tenderness  right sacroiliac joint tenderness   CESAR testing is positive bilaterally  Sensory:  no sensory deficits noted  Range of Motion:  Flexion:   Moderately limited  with pain  Extension:  Moderately limited  with pain  Lateral Flexion - Left:  Moderately limited  with pain  Lateral Flexion - Right:  Moderately limited  with pain  Rotation - Left: Moderately limited  with pain  Rotation - Right:  Moderately limited  with pain   Lumbar facet loading is negative bilaterally  Motor Strength:  Left hip flexion:  5/5  Left hip extension:  5/5  Right hip flexion:  5/5  Right hip extension:  5/5  Left knee flexion:  5/5  Left knee extension:  5/5  Right knee flexion:  5/5  Right knee extension:  5/5  Left foot dorsiflexion:  5/5  Left foot plantar flexion:  5/5  Right foot dorsiflexion:  5/5  Right foot plantar flexion:  5/5  Reflexes:  Left Patellar:  2+   Right Patellar:  2+   Left Achilles:  2+   Right Achilles:  2+   Special Tests:  Left Straight Leg Test:  negative  Right Straight Leg Test:  negative  Left Oleg's Maneuver:  positive  Right Oleg's Maneuver:  positive

## 2023-05-18 NOTE — H&P (VIEW-ONLY)
Assessment:  1  Chronic pain syndrome    2  Chronic bilateral low back pain with bilateral sciatica    3  Sacroiliitis (HCC)        Plan  New Medications Ordered This Visit   Medications   • prednisoLONE 5 MG (21) TBPK   • meloxicam (MOBIC) 7 5 mg tablet   • traMADol (ULTRAM) 50 mg tablet   • pantoprazole (PROTONIX) 40 mg tablet   • Myrbetriq 50 MG TB24   • gabapentin (NEURONTIN) 300 mg capsule   • irbesartan (AVAPRO) 150 mg tablet   • zolpidem (AMBIEN) 5 mg tablet   • carvedilol (COREG) 12 5 mg tablet     Sig: Take 12 5 mg by mouth 2 (two) times a day with meals   • Multiple Vitamin (multivitamin) tablet     Sig: Take 1 tablet by mouth daily     My impressions and treatment recommendations were discussed in detail with the patient, who verbalized understanding and had no further questions  Given that the patient has signs and symptoms consistent with bilateral sacroiliitis, discussed the rationale of undergoing bilateral sacroiliac joint injections since this could be potentially therapeutic  The procedures, its risks, and benefits were explained in detail to the patient  Risks include but are not limited to bleeding, infection, hematoma formation, abscess formation, weakness, headache, failure the pain to improve, nerve irritation or damage, and potential worsening of the pain  The patient verbalized understanding and wished to proceed with the procedure  Follow-up is planned in 4 weeks time or sooner as warranted  Discharge instructions were provided  I personally saw and examined the patient and I agree with the above discussed plan of care  History of Present Illness:    Shannan Moran is a 70 y o  female who presents to [facility] for initial evaluation of the above stated pain complaints  The patient has a past medical and chronic pain history as outlined in the assessment section  She was self-referred      At today's office visit, the patient reports pain primarily overlying the bilateral sacroiliac joints  She describes her pain as severe and 10 out of 10 on the verbal numerical pain rating scale  Her pain is constant in nature  She reports a 3-month history of significant pain in overlying her bilateral sacroiliac joints with radiation in the distribution of the sciatic nerve bilaterally  She reports no typical pattern to her pain  She describes her pain as shooting, numbness, sharp, pressure-like, throbbing, and dull/aching  She reports weakness in her lower extremities  She ambulates with the assistance of a walker  Prayer, lying down, standing, bending, sitting, exercise, relaxation, coughing, sneezing, bowel movements, and menstruation increases pain  There are no pain relieving factors  Physical therapy and heat treatment provides no pain relief  Surgery did provide her moderate pain relief  Tramadol, acetaminophen, and Advil is currently being used  Review of Systems:    Review of Systems   Constitutional: Negative for chills and fatigue  HENT: Negative for ear pain, mouth sores and sinus pressure  Eyes: Negative for pain, redness and visual disturbance  Respiratory: Negative for shortness of breath and wheezing  Cardiovascular: Negative for chest pain and palpitations  Gastrointestinal: Negative for abdominal pain and nausea  Endocrine: Negative for polyphagia  Musculoskeletal: Positive for back pain, gait problem and joint swelling  Negative for arthralgias and neck pain  Decreased ROM   Skin: Negative for wound  Neurological: Positive for dizziness, weakness, light-headedness and numbness  Negative for seizures  Psychiatric/Behavioral: Negative for dysphoric mood and sleep disturbance             Past Medical History:   Diagnosis Date   • Arthritis    • Fibromyalgia, primary        Past Surgical History:   Procedure Laterality Date   • COLON SURGERY     • SPINAL FUSION     • SPINE SURGERY         Family History   Problem Relation Age of Onset   • No Known Problems Mother    • No Known Problems Father        Social History     Occupational History   • Not on file   Tobacco Use   • Smoking status: Never   • Smokeless tobacco: Never   Substance and Sexual Activity   • Alcohol use: Never   • Drug use: Never   • Sexual activity: Never         Current Outpatient Medications:   •  carvedilol (COREG) 12 5 mg tablet, Take 12 5 mg by mouth 2 (two) times a day with meals, Disp: , Rfl:   •  gabapentin (NEURONTIN) 300 mg capsule, , Disp: , Rfl:   •  irbesartan (AVAPRO) 150 mg tablet, , Disp: , Rfl:   •  meloxicam (MOBIC) 7 5 mg tablet, , Disp: , Rfl:   •  Multiple Vitamin (multivitamin) tablet, Take 1 tablet by mouth daily, Disp: , Rfl:   •  Myrbetriq 50 MG TB24, , Disp: , Rfl:   •  pantoprazole (PROTONIX) 40 mg tablet, , Disp: , Rfl:   •  prednisoLONE 5 MG (21) TBPK, , Disp: , Rfl:   •  traMADol (ULTRAM) 50 mg tablet, , Disp: , Rfl:   •  zolpidem (AMBIEN) 5 mg tablet, , Disp: , Rfl:     No Known Allergies    Physical Exam:    /75   Pulse 95   Temp 98 °F (36 7 °C)   Wt 84 4 kg (186 lb)     Constitutional: obese  Eyes: anicteric  HEENT: grossly intact  Neck: supple, symmetric, trachea midline and no masses   Pulmonary:even and unlabored  Cardiovascular:No edema or pitting edema present  Skin:Normal without rashes or lesions and well hydrated  Psychiatric:Mood and affect appropriate  Neurologic:Cranial Nerves II-XII grossly intact  Musculoskeletal:antalgic and ambulates with wheeled walker     Lumbar Spine Exam    Appearance:  Normal lordosis  Palpation/Tenderness:  left sacroiliac joint tenderness  right sacroiliac joint tenderness   CESAR testing is positive bilaterally  Sensory:  no sensory deficits noted  Range of Motion:  Flexion:   Moderately limited  with pain  Extension:  Moderately limited  with pain  Lateral Flexion - Left:  Moderately limited  with pain  Lateral Flexion - Right:  Moderately limited  with pain  Rotation - Left: Moderately limited  with pain  Rotation - Right:  Moderately limited  with pain   Lumbar facet loading is negative bilaterally  Motor Strength:  Left hip flexion:  5/5  Left hip extension:  5/5  Right hip flexion:  5/5  Right hip extension:  5/5  Left knee flexion:  5/5  Left knee extension:  5/5  Right knee flexion:  5/5  Right knee extension:  5/5  Left foot dorsiflexion:  5/5  Left foot plantar flexion:  5/5  Right foot dorsiflexion:  5/5  Right foot plantar flexion:  5/5  Reflexes:  Left Patellar:  2+   Right Patellar:  2+   Left Achilles:  2+   Right Achilles:  2+   Special Tests:  Left Straight Leg Test:  negative  Right Straight Leg Test:  negative  Left Oleg's Maneuver:  positive  Right Oleg's Maneuver:  positive

## 2023-05-18 NOTE — TELEPHONE ENCOUNTER
Scheduled pt for [facility] 6/9/23  Went over pre-procedure instructions below:  Nothing to eat or drink 1 hr prior to procedure  Need to arrange transportation  Proper clothing for procedure  No vaccines 2 weeks prior or after procedure  If ill or placed on antibiotics please call to reschedule

## 2023-05-24 ENCOUNTER — HOSPITAL ENCOUNTER (EMERGENCY)
Facility: HOSPITAL | Age: 78
Discharge: HOME | End: 2023-05-24
Attending: EMERGENCY MEDICINE

## 2023-05-24 ENCOUNTER — APPOINTMENT (EMERGENCY)
Dept: RADIOLOGY | Facility: HOSPITAL | Age: 78
End: 2023-05-24

## 2023-05-24 VITALS
WEIGHT: 170 LBS | SYSTOLIC BLOOD PRESSURE: 192 MMHG | DIASTOLIC BLOOD PRESSURE: 76 MMHG | OXYGEN SATURATION: 97 % | TEMPERATURE: 97 F | HEART RATE: 84 BPM | RESPIRATION RATE: 18 BRPM

## 2023-05-24 DIAGNOSIS — S51.819A FOREARM LACERATION: Primary | ICD-10-CM

## 2023-05-24 PROBLEM — M54.30 SCIATICA: Status: ACTIVE | Noted: 2023-05-24

## 2023-05-24 RX ORDER — CANDESARTAN 32 MG/1
32 TABLET ORAL DAILY
COMMUNITY

## 2023-05-24 RX ORDER — PHENOL 1.4 %
600 AEROSOL, SPRAY (ML) MUCOUS MEMBRANE DAILY
COMMUNITY

## 2023-05-24 RX ORDER — CEFUROXIME AXETIL 500 MG/1
500 TABLET ORAL EVERY 12 HOURS SCHEDULED
COMMUNITY

## 2023-05-24 RX ORDER — CEPHALEXIN 500 MG/1
500 CAPSULE ORAL ONCE
Status: COMPLETED | OUTPATIENT
Start: 2023-05-24 | End: 2023-05-24

## 2023-05-24 RX ORDER — GINSENG 100 MG
1 CAPSULE ORAL 2 TIMES DAILY
Qty: 28 G | Refills: 0 | Status: SHIPPED | OUTPATIENT
Start: 2023-05-24

## 2023-05-24 RX ORDER — CEPHALEXIN 500 MG/1
500 CAPSULE ORAL EVERY 8 HOURS SCHEDULED
Qty: 21 CAPSULE | Refills: 0 | Status: SHIPPED | OUTPATIENT
Start: 2023-05-24 | End: 2023-05-31

## 2023-05-24 RX ORDER — GINSENG 100 MG
1 CAPSULE ORAL ONCE
Status: COMPLETED | OUTPATIENT
Start: 2023-05-24 | End: 2023-05-24

## 2023-05-24 RX ORDER — LIDOCAINE HYDROCHLORIDE AND EPINEPHRINE 10; 10 MG/ML; UG/ML
20 INJECTION, SOLUTION INFILTRATION; PERINEURAL ONCE
Status: COMPLETED | OUTPATIENT
Start: 2023-05-24 | End: 2023-05-24

## 2023-05-24 RX ADMIN — TETANUS TOXOID, REDUCED DIPHTHERIA TOXOID AND ACELLULAR PERTUSSIS VACCINE, ADSORBED 0.5 ML: 5; 2.5; 8; 8; 2.5 SUSPENSION INTRAMUSCULAR at 20:14

## 2023-05-24 RX ADMIN — BACITRACIN 1 SMALL APPLICATION: 500 OINTMENT TOPICAL at 20:15

## 2023-05-24 RX ADMIN — CEPHALEXIN 500 MG: 500 CAPSULE ORAL at 21:35

## 2023-05-24 RX ADMIN — LIDOCAINE HYDROCHLORIDE,EPINEPHRINE BITARTRATE 20 ML: 10; .01 INJECTION, SOLUTION INFILTRATION; PERINEURAL at 20:14

## 2023-05-25 NOTE — ED PROVIDER NOTES
History  Chief Complaint   Patient presents with   • Laceration     Pt reports tripping over cane and falling to the ground  Caught arm on door jam  Lac noted to r arm  Denies hitting head and denies loc  Bleeding controlled at this time  Not on thinners     70-year-old female presents with a laceration to her right forearm she caught it on a door jam today and tripped  Denies any head injury loss of consciousness not on blood thinners no other complaints  History provided by:  Patient   used: No        Prior to Admission Medications   Prescriptions Last Dose Informant Patient Reported? Taking?    Multiple Vitamin (multivitamin) tablet 2023  Yes Yes   Sig: Take 1 tablet by mouth daily   Myrbetriq 50 MG   Yes Yes   Si mg   calcium carbonate (OS-HUE) 600 MG tablet 2023  Yes Yes   Sig: Take 600 mg by mouth daily   candesartan (ATACAND) 32 MG tablet 2023  Yes Yes   Sig: Take 32 mg by mouth daily   carvedilol (COREG) 12 5 mg tablet 2023  Yes Yes   Sig: Take 12 5 mg by mouth 2 (two) times a day with meals   cefuroxime (CEFTIN) 500 mg tablet 2023  Yes Yes   Sig: Take 500 mg by mouth every 12 (twelve) hours   gabapentin (NEURONTIN) 300 mg capsule Not Taking  Yes No   Patient not taking: Reported on 2023   irbesartan (AVAPRO) 150 mg tablet 2023  Yes Yes   meloxicam (MOBIC) 7 5 mg tablet 2023  Yes Yes   pantoprazole (PROTONIX) 40 mg tablet 2023  Yes Yes   prednisoLONE 5 MG (21) TBPK 2023  Yes Yes   traMADol (ULTRAM) 50 mg tablet 2023  Yes Yes   zolpidem (AMBIEN) 5 mg tablet 2023  Yes Yes      Facility-Administered Medications: None       Past Medical History:   Diagnosis Date   • Arthritis    • Fibromyalgia, primary    • Sciatica        Past Surgical History:   Procedure Laterality Date   • COLON SURGERY     • SPINAL FUSION     • SPINE SURGERY         Family History   Problem Relation Age of Onset   • No Known Problems Mother    • No Known Problems Father      I have reviewed and agree with the history as documented  E-Cigarette/Vaping     E-Cigarette/Vaping Substances     Social History     Tobacco Use   • Smoking status: Former     Types: Cigarettes   • Smokeless tobacco: Never   Substance Use Topics   • Alcohol use: Never   • Drug use: Never       Review of Systems   Constitutional: Negative  HENT: Negative  Eyes: Negative  Respiratory: Negative  Cardiovascular: Negative  Gastrointestinal: Negative  Endocrine: Negative  Genitourinary: Negative  Musculoskeletal: Negative  Skin: Positive for wound  Allergic/Immunologic: Negative  Neurological: Negative  Hematological: Negative  Psychiatric/Behavioral: Negative  All other systems reviewed and are negative  Physical Exam  Physical Exam  Vitals and nursing note reviewed  Constitutional:       Appearance: Normal appearance  HENT:      Head: Normocephalic and atraumatic  Nose: Nose normal       Mouth/Throat:      Mouth: Mucous membranes are moist    Eyes:      Extraocular Movements: Extraocular movements intact  Pupils: Pupils are equal, round, and reactive to light  Cardiovascular:      Rate and Rhythm: Normal rate and regular rhythm  Pulmonary:      Effort: Pulmonary effort is normal       Breath sounds: Normal breath sounds  Abdominal:      General: Abdomen is flat  Bowel sounds are normal       Palpations: Abdomen is soft  Musculoskeletal:         General: Normal range of motion  Cervical back: Normal range of motion and neck supple  Comments: Right forearm: 6 cm x 3 cm laceration noted below the olecranon process and elbow joint  Bleeding controlled  Range of motion of the elbow joint intact  Skin:     General: Skin is warm  Capillary Refill: Capillary refill takes less than 2 seconds  Neurological:      General: No focal deficit present        Mental Status: She is alert and oriented to person, place, and time  Mental status is at baseline  Psychiatric:         Mood and Affect: Mood normal          Thought Content: Thought content normal          Vital Signs  ED Triage Vitals [05/24/23 1934]   Temperature Pulse Respirations Blood Pressure SpO2   (!) 97 °F (36 1 °C) 84 18 (!) 192/76 97 %      Temp Source Heart Rate Source Patient Position - Orthostatic VS BP Location FiO2 (%)   Temporal Monitor -- -- --      Pain Score       --           Vitals:    05/24/23 1934   BP: (!) 192/76   Pulse: 84         Visual Acuity      ED Medications  Medications   tetanus-diphtheria-acellular pertussis (BOOSTRIX) IM injection 0 5 mL (0 5 mL Intramuscular Given 5/24/23 2014)   lidocaine-epinephrine (XYLOCAINE/EPINEPHRINE) 1 %-1:100,000 injection 20 mL (20 mL Infiltration Given 5/24/23 2014)   bacitracin topical ointment 1 small application (1 small application Topical Given 5/24/23 2015)   cephalexin (KEFLEX) capsule 500 mg (500 mg Oral Given 5/24/23 2135)       Diagnostic Studies  Results Reviewed     None                 XR forearm 2 views RIGHT    (Results Pending)              Procedures  Laceration repair    Date/Time: 5/24/2023 9:59 PM    Performed by: Jeanne Herrera DO  Authorized by: Jeanne Herrera DO  Consent: Verbal consent obtained    Consent given by: patient  Patient identity confirmed: verbally with patient  Body area: upper extremity  Location details: right lower arm  Laceration length: 7 cm  Anesthesia: local infiltration    Anesthesia:  Local Anesthetic: lidocaine 1% with epinephrine    Wound Dehiscence:  Superficial Wound Dehiscence: simple closure      Procedure Details:  Irrigation solution: saline  Amount of cleaning: extensive  Skin closure: 4-0 nylon  Number of sutures: 13  Technique: simple  Approximation: close  Approximation difficulty: simple  Dressing: 4x4 sterile gauze and antibiotic ointment  Patient tolerance: patient tolerated the procedure well with no immediate complications ED Course                               SBIRT 20yo+    Flowsheet Row Most Recent Value   Initial Alcohol Screen: US AUDIT-C     1  How often do you have a drink containing alcohol? 0 Filed at: 05/24/2023 1938   Audit-C Score 0 Filed at: 05/24/2023 1938   OMARI: How many times in the past year have you    Used an illegal drug or used a prescription medication for non-medical reasons? Never Filed at: 05/24/2023 [facility]                    Medical Decision Making  Patient evaluated with an x-ray  Updated tetanus shot  I repaired the laceration  Instructions provided  Antibiotics provided  Forearm laceration: acute illness or injury  Amount and/or Complexity of Data Reviewed  Radiology: ordered and independent interpretation performed  Decision-making details documented in ED Course  Details: no acute process      Risk  OTC drugs  Prescription drug management  Disposition  Final diagnoses:   Forearm laceration     Time reflects when diagnosis was documented in both MDM as applicable and the Disposition within this note     Time User Action Codes Description Comment    5/24/2023  9:18 PM Becca Bradley Add [C39 [id number]] Forearm laceration       ED Disposition     ED Disposition   Discharge    Condition   Stable    Date/Time   Wed May 24, 2023  9:18 PM    Galina 176 discharge to home/self care                 Follow-up Information     Follow up With Specialties Details Why Contact Info Additional [address] SHAH MD Family Medicine Schedule an appointment as soon as possible for a visit   [address] [facility]  [phone]       [address] [place] [address] Emergency Department Emergency Medicine In 1 week For suture removal [address] [facility] [address] [address] [address] Emergency Department, [place], [place], [address]          Patient's Medications   Discharge Prescriptions    BACITRACIN TOPICAL OINTMENT 500 UNITS/G TOPICAL OINTMENT    Apply 1 large application topically 2 (two) times a day       Start Date: 5/24/2023 End Date: --       Order Dose: 1 large application       Quantity: 28 g    Refills: 0    CEPHALEXIN (KEFLEX) 500 MG CAPSULE    Take 1 capsule (500 mg total) by mouth every 8 (eight) hours for 7 days       Start Date: 5/24/2023 End Date: 5/31/2023       Order Dose: 500 mg       Quantity: 21 capsule    Refills: 0       No discharge procedures on file      PDMP Review     None          ED Provider  Electronically Signed by           Adalgisa Chong DO  05/24/23 9261

## 2023-06-06 ENCOUNTER — TELEPHONE (OUTPATIENT)
Dept: UROLOGY | Facility: AMBULATORY SURGERY CENTER | Age: 78
End: 2023-06-06

## 2023-06-06 NOTE — TELEPHONE ENCOUNTER
New Patient    What is the reason for the patient’s appointment?: Patient moved and looking for a new urologist  Patient was seeing urology for incontinence       What office location does the patient prefer?: [place]     Does patient have Imaging/Lab Results: n/a    Have patient records been requested?:  If No, are the records showing in Epic:       INSURANCE:  Do we accept the patient's insurance or is the patient Self-Pay?: Yes    Insurance Provider: Medicare and [address] [place], [place] [address]  Ne Type/Number:  Member ID#:       HISTORY:   Has the patient had any previous Urologist(s)?: [facility]     Was the patient seen in the ED?: No    Has the patient had any outside testing done?:  n/a    Does the patient have a personal history of cancer?: No    Patient scheduled on 6/14 at 3:30 with Isa Myers

## 2023-06-08 ENCOUNTER — TELEPHONE (OUTPATIENT)
Dept: PAIN MEDICINE | Facility: CLINIC | Age: 78
End: 2023-06-08

## 2023-06-08 NOTE — TELEPHONE ENCOUNTER
Caller: patient    Doctor: [email or web address]    Reason for call: needs to know procedure time now because she has to tell her      Call back#: [phone]

## 2023-06-09 ENCOUNTER — HOSPITAL ENCOUNTER (OUTPATIENT)
Dept: RADIOLOGY | Facility: HOSPITAL | Age: 78
Setting detail: OUTPATIENT SURGERY
Discharge: HOME | End: 2023-06-09
Payer: MEDICARE

## 2023-06-09 ENCOUNTER — HOSPITAL ENCOUNTER (OUTPATIENT)
Facility: AMBULARY SURGERY CENTER | Age: 78
Setting detail: OUTPATIENT SURGERY
Discharge: HOME | End: 2023-06-09
Attending: ANESTHESIOLOGY | Admitting: ANESTHESIOLOGY
Payer: MEDICARE

## 2023-06-09 VITALS
DIASTOLIC BLOOD PRESSURE: 73 MMHG | OXYGEN SATURATION: 97 % | SYSTOLIC BLOOD PRESSURE: 163 MMHG | TEMPERATURE: 97.4 F | RESPIRATION RATE: 16 BRPM | HEART RATE: 72 BPM

## 2023-06-09 DIAGNOSIS — Z92.241 S/P EPIDURAL STEROID INJECTION: ICD-10-CM

## 2023-06-09 PROCEDURE — 27096 INJECT SACROILIAC JOINT: CPT | Performed by: ANESTHESIOLOGY

## 2023-06-09 RX ORDER — METHYLPREDNISOLONE ACETATE 80 MG/ML
INJECTION, SUSPENSION INTRA-ARTICULAR; INTRALESIONAL; INTRAMUSCULAR; SOFT TISSUE AS NEEDED
Status: DISCONTINUED | OUTPATIENT
Start: 2023-06-09 | End: 2023-06-09 | Stop reason: HOSPADM

## 2023-06-09 RX ORDER — LIDOCAINE WITH 8.4% SOD BICARB 0.9%(10ML)
SYRINGE (ML) INJECTION AS NEEDED
Status: DISCONTINUED | OUTPATIENT
Start: 2023-06-09 | End: 2023-06-09 | Stop reason: HOSPADM

## 2023-06-09 RX ORDER — BUPIVACAINE HYDROCHLORIDE 2.5 MG/ML
INJECTION, SOLUTION EPIDURAL; INFILTRATION; INTRACAUDAL AS NEEDED
Status: DISCONTINUED | OUTPATIENT
Start: 2023-06-09 | End: 2023-06-09 | Stop reason: HOSPADM

## 2023-06-09 NOTE — OP NOTE
ATTENDING PHYSICIAN:  Angelika Jin MD     PROCEDURE: Bilateral sacroiliac joint injection with steroid and local anesthetic under fluoroscopic guidance  PREPROCEDURE DIAGNOSIS:  Sacroiliitis  POSTPROCEDURE DIAGNOSIS: Sacroiliitis  ANESTHESIA:  Local     ESTIMATED BLOOD LOSS:  Minimal     COMPLICATIONS:  None  LOCATION:  [facility]  CONSENT:  Today's procedure, its potential benefits as well as its risks and potential side effects were reviewed  Discussed risks of the procedure including bleeding, infection, nerve irritation or damage, reactions to the medications, headache, failure of the pain to improve, and potential worsening of the pain were explained to the patient who verbalized understanding and who wished to proceed  Written informed consent was thereby obtained  DESCRIPTION OF THE PROCEDURE:  After written informed consent was obtained, the patient was taken to the fluoroscopy suite and placed in the prone position  Anatomical landmarks were identified by way of fluoroscopy in multiple views  The skin overlying the sacroiliac region was prepped using antiseptic and draped in the usual sterile fashion  Strict aseptic technique was utilized  The skin and subcutaneous tissues at the needle entry site were infiltrated with 5 mL of 1% preservative-free lidocaine using a 25-gauge 1-1/2-inch needle  A 22-gauge 3-1/2-inch needle was then incrementally advanced using multiple fluoroscopic views into the inferior posterior pole of the left sacroiliac joint  Proper needle placement was confirmed with the aid of fluoroscopy in the AP and lateral views  After negative aspiration, contrast was injected which delineated the sacroiliac joint under fluoroscopy in the AP view  After negative aspiration was reconfirmed, a 3 mL of a mixture consisting of 5 mL of preservative-free 0 25% bupivacaine mixed with 1 mL of 80 mg/mL of Depo-Medrol was slowly injected      Attention was then directed to the right sacroiliac joint  A 22-gauge 3-1/2-inch needle was incrementally advanced using multiple fluoroscopic views into the inferior posterior pole of the right sacroiliac joint  Proper needle placement was confirmed with the aid of fluoroscopy in the AP and lateral views  After negative aspiration, contrast was injected which delineated the right sacroiliac joint under fluoroscopy in the AP view  After negative aspiration was reconfirmed, the remaining 3 mL of the mixture mentioned above was slowly injected  The patient tolerated the procedure well and all needles were removed with the tips intact  Hemostasis was maintained  There were no apparent paresthesias or complications  The skin was wiped clean and a Band-Aid was placed as appropriate  The patient was monitored for an appropriate period of time following the procedure and remained hemodynamically stable and neurovascularly intact following the procedure  The patient was ultimately discharged to home with supervision in good condition and instructed to call the office in a few days for an update or sooner as warranted  I was present and participated in all key and critical portions of this procedure      Amanda Kim MD  6/9/2023  10:28 AM

## 2023-06-09 NOTE — INTERVAL H&P NOTE
H&P reviewed  After examining the patient I find no changes in the patients condition since the H&P had been written      Vitals:    06/09/23 1008   BP: 132/55   Pulse: 69   Resp: 18   Temp: (!) 97 4 °F (36 3 °C)   SpO2: 93%

## 2023-06-14 ENCOUNTER — OFFICE VISIT (OUTPATIENT)
Dept: UROLOGY | Facility: CLINIC | Age: 78
End: 2023-06-14

## 2023-06-14 VITALS
DIASTOLIC BLOOD PRESSURE: 78 MMHG | WEIGHT: 170 LBS | RESPIRATION RATE: 18 BRPM | OXYGEN SATURATION: 98 % | SYSTOLIC BLOOD PRESSURE: 130 MMHG | HEART RATE: 85 BPM

## 2023-06-14 DIAGNOSIS — N39.3 URINARY, INCONTINENCE, STRESS FEMALE: ICD-10-CM

## 2023-06-14 DIAGNOSIS — R32 URINARY INCONTINENCE, UNSPECIFIED TYPE: Primary | ICD-10-CM

## 2023-06-14 LAB
BACTERIA UR QL AUTO: ABNORMAL /HPF
BILIRUB UR QL STRIP: NEGATIVE
CLARITY UR: CLEAR
COLOR UR: YELLOW
GLUCOSE UR STRIP-MCNC: NEGATIVE MG/DL
HGB UR QL STRIP.AUTO: ABNORMAL
HYALINE CASTS #/AREA URNS LPF: ABNORMAL /LPF
KETONES UR STRIP-MCNC: NEGATIVE MG/DL
LEUKOCYTE ESTERASE UR QL STRIP: ABNORMAL
MUCOUS THREADS UR QL AUTO: ABNORMAL
NITRITE UR QL STRIP: NEGATIVE
NON-SQ EPI CELLS URNS QL MICRO: ABNORMAL /HPF
PH UR STRIP.AUTO: 6 [PH]
POST-VOID RESIDUAL VOLUME, ML POC: 0 ML
PROT UR STRIP-MCNC: ABNORMAL MG/DL
RBC #/AREA URNS AUTO: ABNORMAL /HPF
SL AMB  POCT GLUCOSE, UA: NORMAL
SL AMB LEUKOCYTE ESTERASE,UA: NORMAL
SL AMB POCT BILIRUBIN,UA: NORMAL
SL AMB POCT BLOOD,UA: NORMAL
SL AMB POCT CLARITY,UA: CLEAR
SL AMB POCT COLOR,UA: YELLOW
SL AMB POCT KETONES,UA: NORMAL
SL AMB POCT NITRITE,UA: NORMAL
SL AMB POCT PH,UA: 5
SL AMB POCT SPECIFIC GRAVITY,UA: 1015
SL AMB POCT URINE PROTEIN: NORMAL
SL AMB POCT UROBILINOGEN: 0.2
SP GR UR STRIP.AUTO: 1.02 (ref 1–1.03)
UROBILINOGEN UR STRIP-ACNC: <2 MG/DL
WBC #/AREA URNS AUTO: ABNORMAL /HPF

## 2023-06-14 PROCEDURE — 81001 URINALYSIS AUTO W/SCOPE: CPT

## 2023-06-14 NOTE — PROGRESS NOTES
Office Visit- Urology  Ivana Ronquillo 1945 MRN: [id number]      Assessment/Discussion/Plan    70 y o  female managed by     1  Urinary Incontinence   -Stress urinary incontinence  predominant  -PVR 0ml   -UA with blood on dipstick  Microanalysis without clinically significant microscopic hematuria     -Patient's symptomatology is predominantly stress incontinence  Splane that Myrbetriq would not be beneficial for stress urinary incontinence  -Can utilize Myrbetriq prescribed by outside provider if she has bothersome urinary urgency or frequency  -referral given for pelvic floor physical therapy  -referral given to urogynecology   -She can follow-up with office as needed as she will be establishing care with urogynecology for stress urinary continence predominant incontinence  Call with any questions or concerns regarding urology in the meantime      Chief Complaint:   Tai Fu is a 70 y o  female presenting to the office for an initial evaluation regarding  Incontinence        Subjective      -82-year-old female   presents to the office for evaluation of urinary incontinence  -States that her main urinary symptoms is urinary continence with laughing sneezing coughing and standing up and urine testing out  -Was prescribed Myrbetriq 50 mg grams by outside provider she does not currently have a sense of urgency, frequency, dysuria, blood in urine  -No obstructive symptoms including hesitancy, straining to urinate, weak urinary stream, intermittency, prior episodes of urinary retention  -She utilizes 4-5 pads a day  -She has never told that she has a prolapse and had recent pelvic exam within 2023  -No prior surgeries on the kidneys or bladder  She  does report mesh intravaginally   -No personal history of malignancy  -She has no neurologic disease including stroke or diabetes  -Chronic back pain status post surgery -we will plate was dislodged      ROS:   Review of Systems   Constitutional: Negative    Negative for chills, fatigue and fever  HENT: Negative  Respiratory: Negative for shortness of breath  Cardiovascular: Negative for chest pain  Gastrointestinal: Negative  Negative for abdominal pain  Endocrine: Negative  Musculoskeletal: Negative  Skin: Negative  Neurological: Negative  Negative for dizziness and light-headedness  Hematological: Negative  Psychiatric/Behavioral: Negative  Past Medical History  Past Medical History:   Diagnosis Date   • Arthritis    • Fibromyalgia, primary    • Sciatica        Past Surgical History  Past Surgical History:   Procedure Laterality Date   • COLON SURGERY     • [place] INJECT SI JOINT ARTHRGRPHY&/ANES/STEROID W/PRIMO Bilateral 6/9/2023    Procedure: SACROILIAC joint injection (12764 ); Surgeon: Halley Knapp MD;  Location: [facility] MAIN OR;  Service: Pain Management    • SPINAL FUSION     • SPINE SURGERY         Past Family History  Family History   Problem Relation Age of Onset   • No Known Problems Mother    • No Known Problems Father        Past Social history  Social History     Socioeconomic History   • Marital status:       Spouse name: Not on file   • Number of children: Not on file   • Years of education: Not on file   • Highest education level: Not on file   Occupational History   • Not on file   Tobacco Use   • Smoking status: Former     Types: Cigarettes   • Smokeless tobacco: Never   Substance and Sexual Activity   • Alcohol use: Never   • Drug use: Never   • Sexual activity: Never   Other Topics Concern   • Not on file   Social History Narrative   • Not on file     Social Determinants of Health     Financial Resource Strain: Not on file   Food Insecurity: Not on file   Transportation Needs: Not on file   Physical Activity: Not on file   Stress: Not on file   Social Connections: Not on file   Intimate Partner Violence: Not on file   Housing Stability: Not on file       Current Medications  Current Outpatient Medications Medication Sig Dispense Refill   • bacitracin topical ointment 500 units/g topical ointment Apply 1 large application topically 2 (two) times a day 28 g 0   • calcium carbonate (OS-HUE) 600 MG tablet Take 600 mg by mouth daily     • candesartan (ATACAND) 32 MG tablet Take 32 mg by mouth daily     • carvedilol (COREG) 12 5 mg tablet Take 12 5 mg by mouth 2 (two) times a day with meals     • gabapentin (NEURONTIN) 300 mg capsule  (Patient not taking: Reported on 5/24/2023)     • irbesartan (AVAPRO) 150 mg tablet      • meloxicam (MOBIC) 7 5 mg tablet      • Multiple Vitamin (multivitamin) tablet Take 1 tablet by mouth daily     • Myrbetriq 50 MG TB24 25 mg     • pantoprazole (PROTONIX) 40 mg tablet      • prednisoLONE 5 MG (21) TBPK      • traMADol (ULTRAM) 50 mg tablet      • zolpidem (AMBIEN) 5 mg tablet        No current facility-administered medications for this visit  Allergies  No Known Allergies    OBJECTIVE    Vitals   Vitals:    06/14/23 1547   Weight: 77 1 kg (170 lb)       PVR:    Physical Exam  Constitutional:       General: She is not in acute distress  Appearance: Normal appearance  She is normal weight  She is not ill-appearing or toxic-appearing  HENT:      Head: Normocephalic and atraumatic  Eyes:      Conjunctiva/sclera: Conjunctivae normal    Cardiovascular:      Rate and Rhythm: Normal rate  Pulses: Normal pulses  Pulmonary:      Effort: Pulmonary effort is normal  No respiratory distress  Abdominal:      Tenderness: There is no right CVA tenderness or left CVA tenderness  Musculoskeletal:         General: Normal range of motion  Cervical back: Normal range of motion and neck supple  Skin:     General: Skin is warm and dry  Neurological:      General: No focal deficit present  Mental Status: She is alert and oriented to person, place, and time  Cranial Nerves: No cranial nerve deficit     Psychiatric:         Mood and Affect: Mood normal  "Behavior: Behavior normal          Thought Content: Thought content normal           Labs:   LASTLAB(PROTEIN UA,TP,[id number],PROT,PROTEIN UA,PROTEINUA,PROTUR,LABPROTURI,PROTEIN,URPROTEIN)@   No results found for: \"PSA\"  No results found for: \"CREATININE\"   No results found for: \"HGBA1C\"  No results found for: \"BUN\", \"CALCIUM\", \"CL\", \"CO2\", \"CREATININE\", \"GLUCOSE\", \"K\", \"NA\"    I have personally reviewed all pertinent lab results and reviewed with patient    Imaging       Heather Garrison PA-C  Date: 6/14/2023 Time: 3:51 PM  [facility]    This note was written using fluency dictation software  Please excuse any resulting minor grammatical errors      "

## 2023-06-16 ENCOUNTER — TELEPHONE (OUTPATIENT)
Dept: RADIOLOGY | Facility: MEDICAL CENTER | Age: 78
End: 2023-06-16

## 2023-07-10 ENCOUNTER — HOSPITAL ENCOUNTER (OUTPATIENT)
Dept: RADIOLOGY | Facility: HOSPITAL | Age: 78
Discharge: HOME | End: 2023-07-10
Payer: MEDICARE

## 2023-07-10 ENCOUNTER — OFFICE VISIT (OUTPATIENT)
Dept: PAIN MEDICINE | Facility: CLINIC | Age: 78
End: 2023-07-10
Payer: MEDICARE

## 2023-07-10 VITALS
SYSTOLIC BLOOD PRESSURE: 149 MMHG | HEART RATE: 73 BPM | DIASTOLIC BLOOD PRESSURE: 66 MMHG | HEIGHT: 61 IN | BODY MASS INDEX: 32.1 KG/M2 | WEIGHT: 170 LBS

## 2023-07-10 DIAGNOSIS — G89.4 CHRONIC PAIN SYNDROME: Primary | ICD-10-CM

## 2023-07-10 DIAGNOSIS — M54.41 CHRONIC BILATERAL LOW BACK PAIN WITH BILATERAL SCIATICA: ICD-10-CM

## 2023-07-10 DIAGNOSIS — M54.32 SCIATICA OF LEFT SIDE: ICD-10-CM

## 2023-07-10 DIAGNOSIS — M54.42 CHRONIC BILATERAL LOW BACK PAIN WITH BILATERAL SCIATICA: ICD-10-CM

## 2023-07-10 DIAGNOSIS — M54.16 LUMBAR RADICULOPATHY: ICD-10-CM

## 2023-07-10 DIAGNOSIS — G89.29 CHRONIC BILATERAL LOW BACK PAIN WITH BILATERAL SCIATICA: ICD-10-CM

## 2023-07-10 PROCEDURE — 72148 MRI LUMBAR SPINE W/O DYE: CPT

## 2023-07-10 PROCEDURE — G1004 CDSM NDSC: HCPCS

## 2023-07-10 PROCEDURE — 99214 OFFICE O/P EST MOD 30 MIN: CPT

## 2023-07-10 RX ORDER — PREGABALIN 25 MG/1
CAPSULE ORAL
Qty: 56 CAPSULE | Refills: 0 | Status: SHIPPED | OUTPATIENT
Start: 2023-07-10 | End: 2023-08-09

## 2023-07-10 NOTE — PATIENT INSTRUCTIONS
Piriformis muscle spasm    Muscle Spasm   WHAT YOU NEED TO KNOW:   A muscle spasm is a sudden contraction of any muscle or group of muscles. A muscle cramp is a painful muscle spasm. Muscle cramps commonly occur after intense exercise or during pregnancy. They may also be caused by certain medications, dehydration, low calcium or magnesium levels, or another medical condition. DISCHARGE INSTRUCTIONS:   Medicines: You may need the following:  NSAIDs  help decrease swelling and pain or fever. This medicine is available with or without a doctor's order. NSAIDs can cause stomach bleeding or kidney problems in certain people. If you take blood thinner medicine, always ask your healthcare provider if NSAIDs are safe for you. Always read the medicine label and follow directions. Take your medicine as directed. Contact your healthcare provider if you think your medicine is not helping or if you have side effects. Tell him of her if you are allergic to any medicine. Keep a list of the medicines, vitamins, and herbs you take. Include the amounts, and when and why you take them. Bring the list or the pill bottles to follow-up visits. Carry your medicine list with you in case of an emergency. Follow up with your healthcare provider as directed: You may need other tests or treatment. You may also be referred to a physical therapist or other specialist. Write down your questions so you remember to ask them during your visits. Self-care:   Stretch  your muscle to help relieve the cramp. It may be helpful to keep your muscle in the stretched position until the cramp is gone. Apply heat  to help decrease pain and muscle spasms. Apply heat on the area for 20 to 30 minutes every 2 hours for as many days as directed. Apply ice  to help decrease swelling and pain. Ice may also help prevent tissue damage. Use an ice pack, or put crushed ice in a plastic bag.  Cover it with a towel and place it on your muscle for 15 to 20 minutes every hour or as directed. Drink more liquids  to help prevent muscle cramps caused by dehydration. Sports drinks may help replace electrolytes you lose through sweat during exercise. Ask your healthcare provider how much liquid to drink each day and which liquids are best for you. Eat healthy foods , such as fruits, vegetables, whole grains, low-fat dairy products, and lean proteins (meat, beans, and fish). If you are pregnant, ask your healthcare provider about foods that are high in magnesium and sodium. They may help to relieve cramps during pregnancy. Massage your muscle  to help relieve the cramp. Take frequent deep breaths  until the cramp feels better. Lie down while you take the deep breaths so you do not get dizzy or lightheaded. Contact your healthcare provider if:   You have signs of dehydration, such as a headache, dark yellow urine, dry eyes or mouth, or a fast heartbeat. You have questions or concerns about your condition or care. Return to the emergency department if:   You have warmth, swelling, or redness in the cramping muscle. You have frequent or unrelieved muscle cramps in several different muscles. You have muscle cramps with numbness, tingling, and burning in your hands and feet. © Copyright [employer] 2022 Information is for End User's use only and may not be sold, redistributed or otherwise used for commercial purposes. All illustrations and images included in CareNotes® are the copyrighted property of [employer]. or [address] [place] [address]  The above information is an  only. It is not intended as medical advice for individual conditions or treatments. Talk to your doctor, nurse or pharmacist before following any medical regimen to see if it is safe and effective for you.

## 2023-07-10 NOTE — PROGRESS NOTES
Pain Medicine Follow-Up Note    Assessment:  1. Chronic pain syndrome    2. Chronic bilateral low back pain with bilateral sciatica    3. Sciatica of left side    4. Lumbar radiculopathy        Plan:  Orders Placed This Encounter   Procedures   • MRI lumbar spine without contrast     Standing Status:   Future     Standing Expiration Date:   7/10/2027     Scheduling Instructions: There is no preparation for this test. Please leave your jewelry and valuables at home, wedding rings are the exception. All patients will be required to change into a hospital gown and pants. Street clothes are not permitted in the MRI. Magnetic nail polish must be removed prior to arrival for your test. Please bring your insurance cards, a form of photo ID and a list of your medications with you. Arrive 15 minutes prior to your appointment time in order to register. Please bring any prior CT or MRI studies of this area that were not performed at a [facility]. To schedule this appointment, please contact Central Scheduling at [phone]. Prior to your appointment, please make sure you complete the MRI Screening Form when you e-Check in for your appointment. This will be available starting 7 days before your appointment in [address] [place] [address]. You may receive an e-mail with an activation code if you do not have a [employer] account. If you do not have access to a device, we will complete your screening at your appointment. Order Specific Question:   What is the patient's sedation requirement? If Medication for Claustrophobia is selected, order medication at this point. Answer:   No Sedation     Order Specific Question:   Does this procedure require the 3T MRI at [facility] or [place]?     Answer:   No     Order Specific Question:   Release to patient through AskYou     Answer:   Immediate     Order Specific Question:   Is order priority selected as STAT?      Answer:   No     Order Specific Question:   Reason for Exam (FREE TEXT)     Answer:   lumbar radiculopathy, failed PT       New Medications Ordered This Visit   Medications   • pregabalin (LYRICA) 25 mg capsule     Sig: Take 1 capsule (25 mg total) by mouth daily at bedtime for 4 days, THEN 1 capsule (25 mg total) 2 (two) times a day for 26 days. Dispense:  56 capsule     Refill:  0       My impressions and treatment recommendations were discussed in detail with the patient who verbalized understanding and had no further questions. Patient presents the office following up on bilateral SI joint injections that the patient received on 6/9/2023. Patient reports that she may have received some minimal pain relief for a couple of days but it was not significant and her pain is much worse currently with a pain score of 10 out of 10 on the verbal numeric pain scale. Patient stating that her pain is located in her bilateral low back radiating down through her buttocks and down her left posterior leg. On exam the patient has a very tight and tender left piriformis muscle. She does have a positive left piriformis muscle stretch therefore I feel she would benefit from a left piriformis muscle injection. Patient is in agreement with this plan. Complete risks and benefits including bleeding, infection, tissue reaction, nerve injury and allergic reaction were discussed. The approach was demonstrated using models and literature was provided. Verbal and written consent was obtained. In the past the patient has trialed gabapentin which she stated she was only able to take at bedtime due to drowsiness. I recommend that she trial pregabalin 25 mg capsule patient educated to take the medication at bedtime for 4 days and then take it twice a day. Patient verbalized understanding. Patient also states that she was told to stop physical therapy due to increased pain at this time I will order an updated MRI of her lumbar spine.   Patient reports she had 1 a few years back at a location affiliated with [facility].  I will reach out to [facility] in [place] for last lumbar MRI. Patient also asking if we do sedation for injections since at [facility] JOSE ROBERTO would place her in a twilight for injection. I informed her we do not sedate for procedures. Follow-up is planned in 8 weeks time or sooner as warranted. Discharge instructions were provided. I personally saw and examined the patient and I agree with the above discussed plan of care. History of Present Illness:    Papito Elder is a 70 y.o. female who presents to [address] [place] [address] [facility] for interval re-evaluation of the above stated pain complaints. The patient has a past medical and chronic pain history as outlined in the assessment section. She was last seen on 6/9/2023. At today's visit patient states that their pain symptoms are worse with a pain score of 10/10 on the verbal numeric pain scale. The patient's pain is worse in the morning and in the evening. On 6/9/2023 patient had bilateral sacroiliac joint injections which she reports very minimal pain relief. The patient's pain is constant in nature. And the quality of the patient's pain is described as shooting, pins-and-needles, and sciatica. The patient's pain is located in the bilateral low back, bilateral buttocks, down left posterior leg. Other than as stated above, the patient denies any interval changes in medications, medical condition, mental condition, symptoms, or allergies since the last office visit. Review of Systems:    Review of Systems   Respiratory: Negative for shortness of breath. Cardiovascular: Negative for chest pain. Gastrointestinal: Negative for constipation, diarrhea, nausea and vomiting. Musculoskeletal: Positive for gait problem. Negative for arthralgias, joint swelling and myalgias. Decreased ROM, joint stiffness, pain in the left posterior leg   Skin: Negative for rash.    Neurological: Negative for dizziness, seizures and weakness. All other systems reviewed and are negative. Past Medical History:   Diagnosis Date   • Arthritis    • Fibromyalgia, primary    • Sciatica        Past Surgical History:   Procedure Laterality Date   • COLON SURGERY     • [place] INJECT SI JOINT ARTHRGRPHY&/ANES/STEROID W/PRIMO Bilateral 6/9/2023    Procedure: SACROILIAC joint injection (62842 ); Surgeon: Eugenie Warner MD;  Location: [facility] MAIN OR;  Service: Pain Management    • SPINAL FUSION     • SPINE SURGERY         Family History   Problem Relation Age of Onset   • No Known Problems Mother    • No Known Problems Father        Social History     Occupational History   • Not on file   Tobacco Use   • Smoking status: Former     Types: Cigarettes   • Smokeless tobacco: Never   Substance and Sexual Activity   • Alcohol use: Never   • Drug use: Never   • Sexual activity: Never         Current Outpatient Medications:   •  bacitracin topical ointment 500 units/g topical ointment, Apply 1 large application topically 2 (two) times a day, Disp: 28 g, Rfl: 0  •  calcium carbonate (OS-HUE) 600 MG tablet, Take 600 mg by mouth daily, Disp: , Rfl:   •  candesartan (ATACAND) 32 MG tablet, Take 32 mg by mouth daily, Disp: , Rfl:   •  carvedilol (COREG) 12.5 mg tablet, Take 12.5 mg by mouth 2 (two) times a day with meals, Disp: , Rfl:   •  irbesartan (AVAPRO) 150 mg tablet, , Disp: , Rfl:   •  meloxicam (MOBIC) 7.5 mg tablet, , Disp: , Rfl:   •  Multiple Vitamin (multivitamin) tablet, Take 1 tablet by mouth daily, Disp: , Rfl:   •  Myrbetriq 50 MG TB24, 25 mg, Disp: , Rfl:   •  pantoprazole (PROTONIX) 40 mg tablet, , Disp: , Rfl:   •  prednisoLONE 5 MG (21) TBPK, , Disp: , Rfl:   •  pregabalin (LYRICA) 25 mg capsule, Take 1 capsule (25 mg total) by mouth daily at bedtime for 4 days, THEN 1 capsule (25 mg total) 2 (two) times a day for 26 days. , Disp: 56 capsule, Rfl: 0  •  traMADol (ULTRAM) 50 mg tablet, , Disp: , Rfl:   • zolpidem (AMBIEN) 5 mg tablet, , Disp: , Rfl:     No Known Allergies    Physical Exam:    /66   Pulse 73   Ht 5' 1" (1.549 m) Comment: verbal  Wt 77.1 kg (170 lb) Comment: verbal  BMI 32.12 kg/m²     Constitutional:normal, well developed, well nourished, alert, in no distress and non-toxic and no overt pain behavior. and obese  Eyes:anicteric  HEENT:grossly intact  Neck:supple, symmetric, trachea midline and no masses   Pulmonary:even and unlabored  Cardiovascular:No edema or pitting edema present  Skin:Normal without rashes or lesions and well hydrated  Psychiatric:Mood and affect appropriate  Neurologic:Cranial Nerves II-XII grossly intact  Musculoskeletal:antalgic and Ambulates with a rolling walker    Lumbar Spine Exam    Appearance:  Normal lordosis  Palpation/Tenderness:  left piriformis tenderness  Special Tests:  Left Piriformis Stretch Test:  positive   Left slump test: Positive      Imaging  MRI lumbar spine without contrast    (Results Pending)         Orders Placed This Encounter   Procedures   • MRI lumbar spine without contrast       This document was created using speech voice recognition software. Grammatical errors, random word insertions, pronoun errors, and incomplete sentences are an occasional consequence of this system due to software limitations, ambient noise, and hardware issues. Any formal questions or concerns about content, text, or information contained within the body of this dictation should be directly addressed to the provider for clarification.

## 2023-07-13 ENCOUNTER — TELEPHONE (OUTPATIENT)
Dept: RADIOLOGY | Facility: CLINIC | Age: 78
End: 2023-07-13

## 2023-07-13 NOTE — TELEPHONE ENCOUNTER
S/w pt, advised of the same. Pt verbalized understanding and agreeable to TAI.  Please reach out to schedule, thank you

## 2023-07-13 NOTE — TELEPHONE ENCOUNTER
----- Message from Krysten Herrera, [address] [place] [address] sent at 7/12/2023  6:30 PM EDT -----  Moderate to severe foraminal narrowing is present on the left at L4-L5 and bilaterally at L5-S1 with contact of the exiting nerve roots.     You may benefit from a L5-S1 LESI

## 2023-07-17 ENCOUNTER — PROCEDURE VISIT (OUTPATIENT)
Dept: PAIN MEDICINE | Facility: CLINIC | Age: 78
End: 2023-07-17
Payer: MEDICARE

## 2023-07-17 VITALS — TEMPERATURE: 98.2 F

## 2023-07-17 DIAGNOSIS — M54.16 LUMBAR RADICULOPATHY: ICD-10-CM

## 2023-07-17 DIAGNOSIS — M54.41 LOW BACK PAIN DUE TO BILATERAL SCIATICA: ICD-10-CM

## 2023-07-17 DIAGNOSIS — M54.50 CHRONIC LOW BACK PAIN: ICD-10-CM

## 2023-07-17 DIAGNOSIS — M54.42 LOW BACK PAIN DUE TO BILATERAL SCIATICA: ICD-10-CM

## 2023-07-17 DIAGNOSIS — G89.29 CHRONIC BILATERAL LOWER ABDOMINAL PAIN: ICD-10-CM

## 2023-07-17 DIAGNOSIS — G89.4 CHRONIC PAIN SYNDROME: Primary | ICD-10-CM

## 2023-07-17 DIAGNOSIS — R10.31 CHRONIC BILATERAL LOWER ABDOMINAL PAIN: ICD-10-CM

## 2023-07-17 DIAGNOSIS — M54.42 LEFT-SIDED LOW BACK PAIN WITH BILATERAL SCIATICA: Primary | ICD-10-CM

## 2023-07-17 DIAGNOSIS — M54.32 SCIATICA OF LEFT SIDE: ICD-10-CM

## 2023-07-17 DIAGNOSIS — M54.41 LEFT-SIDED LOW BACK PAIN WITH BILATERAL SCIATICA: Primary | ICD-10-CM

## 2023-07-17 DIAGNOSIS — R10.32 CHRONIC BILATERAL LOWER ABDOMINAL PAIN: ICD-10-CM

## 2023-07-17 DIAGNOSIS — G89.29 CHRONIC LOW BACK PAIN: ICD-10-CM

## 2023-07-17 PROCEDURE — 76942 ECHO GUIDE FOR BIOPSY: CPT | Performed by: ANESTHESIOLOGY

## 2023-07-17 PROCEDURE — 20550 NJX 1 TENDON SHEATH/LIGAMENT: CPT | Performed by: ANESTHESIOLOGY

## 2023-07-17 RX ORDER — LIDOCAINE HYDROCHLORIDE 10 MG/ML
5 INJECTION, SOLUTION INFILTRATION; PERINEURAL ONCE
Status: CANCELLED | OUTPATIENT
Start: 2023-07-17 | End: 2023-07-17

## 2023-07-17 RX ORDER — METHYLPREDNISOLONE ACETATE 40 MG/ML
40 INJECTION, SUSPENSION INTRA-ARTICULAR; INTRALESIONAL; INTRAMUSCULAR; SOFT TISSUE ONCE
Status: COMPLETED | OUTPATIENT
Start: 2023-07-17 | End: 2023-07-17

## 2023-07-17 RX ORDER — BUPIVACAINE HYDROCHLORIDE 2.5 MG/ML
10 INJECTION, SOLUTION EPIDURAL; INFILTRATION; INTRACAUDAL ONCE
Status: COMPLETED | OUTPATIENT
Start: 2023-07-17 | End: 2023-07-17

## 2023-07-17 RX ADMIN — METHYLPREDNISOLONE ACETATE 40 MG: 40 INJECTION, SUSPENSION INTRA-ARTICULAR; INTRALESIONAL; INTRAMUSCULAR; SOFT TISSUE at 14:35

## 2023-07-17 RX ADMIN — BUPIVACAINE HYDROCHLORIDE 10 ML: 2.5 INJECTION, SOLUTION EPIDURAL; INFILTRATION; INTRACAUDAL at 14:34

## 2023-07-17 NOTE — PROGRESS NOTES
ATTENDING PHYSICIAN:  Prema Melchor MD.    PROCEDURE:  Left piriformis muscle injection with steroid and local anesthetic under ultrasound guidance. PRE-PROCEDURE DIAGNOSIS:   Left piriformis muscle myofascial pain syndrome    POST-PROCEDURE DIAGNOSIS:   Left piriformis muscle myofascial pain syndrome    ANESTHESIA:  Local.    ESTIMATED BLOOD LOSS:  Minimal.    COMPLICATIONS:  None. LOCATION:  [facility], [employer]. .    CONSENT:  Today's procedure, its potential benefits as well as its risks and potential side effects were reviewed. Discussed risks of the procedure including bleeding, infection, soft tissue irritation, nerve irritation or damage, reactions to the medications, weakness, failure of the pain to improve, and potential worsening of the pain were explained to the patient who verbalized understanding and who wished to proceed. Written informed consent was thereby obtained. DESCRIPTION OF THE PROCEDURE:  After written informed consent was obtained, the patient was taken to the ultrasound suite and placed in the prone position. Anatomical landmarks were identified. The skin overlying this point was prepped and draped in the usual sterile fashion with antiseptic swabs. A 22-gauge 3-1/2 inch spinal needle was then advanced under ultrasound guidance in the piriformis muscle which was confirmed on the ultrasound screen while the lower extremity was internally and externally rotated. A 5 mL injectate consisting of 1 mL of Depo-Medrol 40 mg/mL mixed with 4 mL of preservative-free 0.25% bupivacaine were slowly injected after negative aspiration. The patient tolerated the procedure well and all needles were removed intact. Hemostasis was maintained. There were no apparent paresthesias or complications. The skin was wiped clean and a Band-Aid was placed as appropriate.  The patient was monitored for an appropriate period of time and remained stable following the procedure. The patient was ultimately discharged to home with supervision in good condition and instructed to call the office in approximately 7-10 days with an update or sooner as warranted. Discharge instructions were provided. I was present for and participated in all key and critical portions of this procedure.     Janessa Gabriel MD  7/17/2023  2:33 PM

## 2023-07-24 ENCOUNTER — TELEPHONE (OUTPATIENT)
Dept: PAIN MEDICINE | Facility: CLINIC | Age: 78
End: 2023-07-24

## 2023-07-25 NOTE — TELEPHONE ENCOUNTER
Caller: seven    Doctor: [email or web address]    Reason for call: 20% [facility]    Call back#: [phone]

## 2023-07-26 ENCOUNTER — TELEPHONE (OUTPATIENT)
Dept: PAIN MEDICINE | Facility: CLINIC | Age: 78
End: 2023-07-26

## 2023-07-26 NOTE — TELEPHONE ENCOUNTER
Scheduled patient for LESI 8/25/23  Patient denies RX blood thinners/ NSAIDS  Nothing to eat or drink 1 hour prior to procedure  Needs to arrange transportation  Proper clothing for procedure  No vaccines 2 weeks prior or after procedure  If ill or place on antibiotics, please call to reschedule

## 2023-07-26 NOTE — TELEPHONE ENCOUNTER
----- Message from Nhan Arevalo sent at 7/25/2023  3:41 PM EDT -----  [place] Afternoon,     Patient would like to schedule L5-S1 LESI with Dr Carmen Fox at the [place] location . Would you please call her to schedule. She advised she is not taking any blood thinning medication.

## 2023-08-02 NOTE — TELEPHONE ENCOUNTER
The procedure has been reschedule due to Dr Daina Buckner no longer with our office to 8/25/23 with Dr Shauna Ritchie. The pt is aware and agreeable to the changes.

## 2023-08-19 DIAGNOSIS — M54.32 SCIATICA OF LEFT SIDE: ICD-10-CM

## 2023-08-19 DIAGNOSIS — M54.16 LUMBAR RADICULOPATHY: ICD-10-CM

## 2023-08-21 RX ORDER — PREGABALIN 25 MG/1
25 CAPSULE ORAL 2 TIMES DAILY
Qty: 60 CAPSULE | Refills: 1 | Status: SHIPPED | OUTPATIENT
Start: 2023-08-21

## 2023-08-21 NOTE — TELEPHONE ENCOUNTER
S/w pt who is rqst rf of Lyrica 25mg BID. Pt states doing well on current med reg and denies SE. Pls send rf to [email or web address] on file. Thank you!

## 2023-08-25 ENCOUNTER — APPOINTMENT (OUTPATIENT)
Dept: RADIOLOGY | Facility: HOSPITAL | Age: 78
End: 2023-08-25
Payer: MEDICARE

## 2023-08-25 ENCOUNTER — HOSPITAL ENCOUNTER (OUTPATIENT)
Facility: AMBULARY SURGERY CENTER | Age: 78
Setting detail: OUTPATIENT SURGERY
Discharge: HOME | End: 2023-08-25
Attending: PHYSICAL MEDICINE & REHABILITATION | Admitting: PHYSICAL MEDICINE & REHABILITATION
Payer: MEDICARE

## 2023-08-25 VITALS
RESPIRATION RATE: 18 BRPM | TEMPERATURE: 96.8 F | HEART RATE: 74 BPM | OXYGEN SATURATION: 94 % | DIASTOLIC BLOOD PRESSURE: 74 MMHG | SYSTOLIC BLOOD PRESSURE: 145 MMHG

## 2023-08-25 PROCEDURE — 62323 NJX INTERLAMINAR LMBR/SAC: CPT | Performed by: PHYSICAL MEDICINE & REHABILITATION

## 2023-08-25 RX ORDER — LIDOCAINE HYDROCHLORIDE 10 MG/ML
INJECTION, SOLUTION EPIDURAL; INFILTRATION; INTRACAUDAL; PERINEURAL AS NEEDED
Status: DISCONTINUED | OUTPATIENT
Start: 2023-08-25 | End: 2023-08-25 | Stop reason: HOSPADM

## 2023-08-25 RX ORDER — METHYLPREDNISOLONE ACETATE 80 MG/ML
INJECTION, SUSPENSION INTRA-ARTICULAR; INTRALESIONAL; INTRAMUSCULAR; SOFT TISSUE AS NEEDED
Status: DISCONTINUED | OUTPATIENT
Start: 2023-08-25 | End: 2023-08-25 | Stop reason: HOSPADM

## 2023-08-25 NOTE — DISCHARGE INSTRUCTIONS
Epidural Steroid Injection   WHAT YOU NEED TO KNOW:   An epidural steroid injection (TAI) is a procedure to inject steroid medicine into the epidural space. The epidural space is between your spinal cord and vertebrae. Steroids reduce inflammation and fluid buildup in your spine that may be causing pain. You may be given pain medicine along with the steroids. ACTIVITY  Do not drive or operate machinery today. No strenuous activity today - bending, lifting, etc.  You may resume normal activites starting tomorrow - start slowly and as tolerated. You may shower today, but no tub baths or hot tubs. You may have numbness for several hours from the local anesthetic. Please use caution and common sense, especially with weight-bearing activities. CARE OF THE INJECTION SITE  If you have soreness or pain, apply ice to the area today (20 minutes on/20 minutes off). Starting tomorrow, you may use warm, moist heat or ice if needed. You may have an increase or change in your discomfort for 36-48 hours after your treatment. Apply ice and continue with any pain medication you have been prescribed. Notify [facility] if you have any of the following: redness, drainage, swelling, headache, stiff neck or fever above 100°F.    SPECIAL INSTRUCTIONS  Our office will contact you in approximately 7 days for a progress report. MEDICATIONS  Continue to take all routine medications. Our office may have instructed you to hold some medications. As no general anesthesia was used in today's procedure, you should not experience any side effects related to anesthesia. If you are diabetic, the steroids used in today's injection may temporarily increase your blood sugar levels after the first few days after your injection. Please keep a close eye on your sugars and alert the doctor who manages your diabetes if your sugars are significantly high from your baseline or you are symptomatic.      If you have a problem specifically related to your procedure, please call our office at [phone]. Problems not related to your procedure should be directed to your primary care physician.

## 2023-08-25 NOTE — OP NOTE
OPERATIVE REPORT  PATIENT NAME: Dino Caal    :  1945  MRN: [id number]  Pt Location: [facility] [place] MINOR/PAIN ROOM 01    SURGERY DATE: 2023    Surgeon(s) and Role:     * aRcquel Phillips, DO - Primary     * Luis E Chowdhury MD - Observing    Preop Diagnosis:  Lumbar radiculopathy [M54.16]  Chronic low back pain with bilateral sciatica, unspecified back pain laterality [M54.41, G89.29, M54.42]  Chronic pain syndrome [G89.4]    Post-Op Diagnosis Codes:     * Lumbar radiculopathy [M54.16]     * Chronic low back pain with bilateral sciatica, unspecified back pain laterality [M54.41, G89.29, M54.42]     * Chronic pain syndrome [G89.4]    Procedure(s):  L5-S1 LUMBAR epidural steroid injection (76931)    Lumbar epidural  Indication:  Back and radiating leg pain  Preoperative diagnosis:  Lumbar radiculitis  Postoperative diagnosis:  Lumbar radiculitis    Procedure: Fluoroscopically-guided L5-S1 interlaminar epidural steroid injection under fluoroscopy    EBL:  none  Specimens:  not applicable    After discussing the risks, benefits, and alternatives to the procedure, the patient expressed understanding and wished to proceed. The patient was brought to the fluoroscopy suite and placed in the prone position. A procedural pause was conducted to verify:  correct patient identity, procedure to be performed and as applicable, correct side and site, correct patient position, and availability of implants, special equipment and special requirements. After identifying the L5-S1 space fluoroscopically, the skin was sterilely prepped and draped in the usual fashion using Chloraprep skin prep. The skin and subcutaneous tissues were anesthetized with 1% lidocaine. Utilizing a loss of resistance technique and intermittent fluoroscopic guidance, a 3.5 inch 20-gauge Tuohy needle was advanced into the epidural space. Proper needle positioning was confirmed using multiple fluoroscopic views.   After negative aspiration, Omnipaque 240 contrast was injected confirming epidural spread without evidence of intravascular or intrathecal spread. A 4 ml solution consisting of 80 mg Depo-Medrol in sterile saline was injected slowly and incrementally into the epidural space. Following the injection the needle was withdrawn slightly and flushed with 1% buffered lidocaine as it was fully extracted. The patient tolerated the procedure well and there were no apparent complications. After appropriate observation, the patient was dismissed from the clinic in good condition under their own power.           SIGNATURE: David Avila DO  DATE: August 25, 2023  TIME: 2:31 PM

## 2023-08-25 NOTE — H&P
History of Present Illness: The patient is a 70 y.o. female who presents with complaints of low back pain    Past Medical History:   Diagnosis Date   • Arthritis    • Fibromyalgia, primary    • Sciatica        Past Surgical History:   Procedure Laterality Date   • COLON SURGERY     • ID INJECT SI JOINT ARTHRGRPHY&/ANES/STEROID W/PRIMO Bilateral 6/9/2023    Procedure: SACROILIAC joint injection (92759 ); Surgeon: Lillian Gallardo MD;  Location: [facility] MAIN OR;  Service: Pain Management    • SPINAL FUSION     • SPINE SURGERY         No current facility-administered medications for this encounter.     No Known Allergies    Physical Exam:   Vitals:    08/25/23 1312   BP: 131/68   Pulse: 81   Resp: 18   Temp: (!) 96.8 °F (36 °C)   SpO2: 96%     General: Awake, Alert, Oriented x 3, Mood and affect appropriate  Respiratory: Respirations even and unlabored  Cardiovascular: Peripheral pulses intact; no edema  Musculoskeletal Exam: Tenderness palpation bilateral lumbar paraspinals    ASA Score: 2    Patient/Chart Verification  Patient ID Verified: Verbal, Armband  ID Band Applied: Yes  Consents Confirmed: Procedural  H&P( within 30 days) Verified: Yes  Interval H&P(within 24 hr) Complete (required for Outpatients and Surgery Admit only): Yes  Beta Blocker given : N/A  Pre-op Lab/Test Results Available: N/A  Pregnancy Lab Collected: N/A comment  Does Patient Have a Prosthetic Device/Implant: Yes    Assessment: Lumbar radiculopathy  Plan: L5-S1 LESI

## 2023-09-01 ENCOUNTER — TELEPHONE (OUTPATIENT)
Dept: RADIOLOGY | Facility: MEDICAL CENTER | Age: 78
End: 2023-09-01

## 2023-09-01 NOTE — TELEPHONE ENCOUNTER
Patient Reports    30    %     improvement post injection    Pain Level   4  /10      Patient is aware we will call back next week for an update.

## 2023-10-03 ENCOUNTER — OFFICE VISIT (OUTPATIENT)
Dept: PAIN MEDICINE | Facility: CLINIC | Age: 78
End: 2023-10-03
Payer: MEDICARE

## 2023-10-03 VITALS
DIASTOLIC BLOOD PRESSURE: 78 MMHG | BODY MASS INDEX: 35.14 KG/M2 | SYSTOLIC BLOOD PRESSURE: 132 MMHG | HEART RATE: 68 BPM | WEIGHT: 186 LBS | TEMPERATURE: 98.2 F

## 2023-10-03 DIAGNOSIS — M51.16 LUMBAR DISC DISEASE WITH RADICULOPATHY: Primary | ICD-10-CM

## 2023-10-03 DIAGNOSIS — M48.062 SPINAL STENOSIS OF LUMBAR REGION WITH NEUROGENIC CLAUDICATION: ICD-10-CM

## 2023-10-03 DIAGNOSIS — Z98.890 HISTORY OF LUMBAR SURGERY: ICD-10-CM

## 2023-10-03 PROCEDURE — 99214 OFFICE O/P EST MOD 30 MIN: CPT | Performed by: PHYSICAL MEDICINE & REHABILITATION

## 2023-10-03 RX ORDER — DULOXETIN HYDROCHLORIDE 30 MG/1
30 CAPSULE, DELAYED RELEASE ORAL DAILY
Qty: 30 CAPSULE | Refills: 1 | Status: SHIPPED | OUTPATIENT
Start: 2023-10-03

## 2023-10-03 NOTE — PROGRESS NOTES
Pain Medicine Follow-Up Note    Assessment:  1. Lumbar disc disease with radiculopathy    2. Spinal stenosis of lumbar region with neurogenic claudication    3. History of lumbar surgery        Plan:    Ms. Cristofer Wilson is a pleasant 79-year-old female who presents to [facility] spine pain [facility] for follow-up and reevaluation regarding ongoing low back pain with radicular symptoms into the left lower extremity. She does have a significant past medical history of an L3 and L5 fusion and reports minimal relief after the most recent L5-S1 LESI August 25, 2023. Dr. Lynnette Krabbe has also previously attempted piriformis and SI joint injections again with no relief in her pain. Lyrica also has provided no relief. We have exhausted several different interventional and medication approaches with minimal relief. At this time we will trial Cymbalta 30 mg daily for neuropathic pain control and mood stabilization. We did discuss neuromodulation and I did also present neurosurgical consultation but she reports refusing to consider surgery now or in the future. For now we will focus on medication management and also provide the patient with Vyu spinal cord stimulator packet of information. All questions answered, patient is agreeable with plan. History of Present Illness:    Carmen Mercado is a 70 y.o. female who presents to [facility] for interval re-evaluation of the above stated pain complaints. The patient has a past medical and chronic pain history as outlined in the assessment section. Patient presents to [facility] spine pain [facility] for follow-up and reevaluation regarding low back pain with radiating symptoms into the left lower extremity currently rated 8 out of 10 and interfere with activities. Currently taking Lyrica and has recently received a lumbar epidural steroid injection L5-S1 LESI on August 25, 2023 presents for follow-up and reevaluation.     Other than as stated above, the patient denies any interval changes in medications, medical condition, mental condition, symptoms, or allergies since the last office visit. Review of Systems:    Review of Systems   Constitutional: Negative for unexpected weight change. HENT: Negative for ear pain. Eyes: Negative for visual disturbance. Respiratory: Negative for shortness of breath and wheezing. Gastrointestinal: Negative for abdominal pain. Musculoskeletal: Positive for back pain and gait problem. Decreased rom, joint and muscle pain, pain in the buttocks   Neurological: Positive for weakness. Negative for numbness. Psychiatric/Behavioral: Negative for decreased concentration. Past Medical History:   Diagnosis Date   • Arthritis    • Fibromyalgia, primary    • Sciatica        Past Surgical History:   Procedure Laterality Date   • COLON SURGERY     • EPIDURAL BLOCK INJECTION N/A 8/25/2023    Procedure: L5-S1 LUMBAR epidural steroid injection (18463); Surgeon: Clarke Farfan DO;  Location: [facility] MAIN OR;  Service: Pain Management    • NE INJECT SI JOINT ARTHRGRPHY&/ANES/STEROID W/PRIMO Bilateral 6/9/2023    Procedure: SACROILIAC joint injection (68182 );   Surgeon: Briseida Rome MD;  Location: [facility] MAIN OR;  Service: Pain Management    • SPINAL FUSION     • SPINE SURGERY         Family History   Problem Relation Age of Onset   • No Known Problems Mother    • No Known Problems Father        Social History     Occupational History   • Not on file   Tobacco Use   • Smoking status: Former     Types: Cigarettes   • Smokeless tobacco: Never   Substance and Sexual Activity   • Alcohol use: Never   • Drug use: Never   • Sexual activity: Never         Current Outpatient Medications:   •  calcium carbonate (OS-HUE) 600 MG tablet, Take 600 mg by mouth daily, Disp: , Rfl:   •  candesartan (ATACAND) 32 MG tablet, Take 32 mg by mouth daily, Disp: , Rfl:   •  carvedilol (COREG) 12.5 mg tablet, Take 12.5 mg by mouth 2 (two) times a day with meals, Disp: , Rfl:   •  DULoxetine (CYMBALTA) 30 mg delayed release capsule, Take 1 capsule (30 mg total) by mouth daily, Disp: 30 capsule, Rfl: 1  •  irbesartan (AVAPRO) 150 mg tablet, , Disp: , Rfl:   •  meloxicam (MOBIC) 7.5 mg tablet, , Disp: , Rfl:   •  Multiple Vitamin (multivitamin) tablet, Take 1 tablet by mouth daily, Disp: , Rfl:   •  pantoprazole (PROTONIX) 40 mg tablet, , Disp: , Rfl:   •  prednisoLONE 5 MG (21) TBPK, , Disp: , Rfl:   •  pregabalin (LYRICA) 25 mg capsule, Take 1 capsule (25 mg total) by mouth 2 (two) times a day, Disp: 60 capsule, Rfl: 1  •  zolpidem (AMBIEN) 5 mg tablet, , Disp: , Rfl:   •  bacitracin topical ointment 500 units/g topical ointment, Apply 1 large application topically 2 (two) times a day, Disp: 28 g, Rfl: 0  •  Myrbetriq 50 MG TB24, 25 mg, Disp: , Rfl:   •  traMADol (ULTRAM) 50 mg tablet, , Disp: , Rfl:     No Known Allergies    Physical Exam:    /78   Pulse 68   Temp 98.2 °F (36.8 °C)   Wt 84.4 kg (186 lb)   BMI 35.14 kg/m²     Constitutional:normal, well developed, well nourished, alert, in no distress and non-toxic and no overt pain behavior.   Eyes:anicteric  HEENT:grossly intact  Neck:supple, symmetric, trachea midline and no masses   Pulmonary:even and unlabored  Cardiovascular:No edema or pitting edema present  Skin:Normal without rashes or lesions and well hydrated  Psychiatric:Mood and affect appropriate  Neurologic:Cranial Nerves II-XII grossly intact  Musculoskeletal: Antalgic and ambulates with rolling walker      Imaging  No orders to display         Orders Placed This Encounter   Procedures   • Ambulatory referral to Physical Therapy

## 2023-10-23 NOTE — DISCHARGE INSTRUCTIONS
Steroid Joint Injection   WHAT YOU NEED TO KNOW:   A steroid joint injection is a procedure to inject steroid medicine into a joint  Steroid medicine decreases pain and inflammation  The injection may also contain an anesthetic (numbing medicine) to decrease pain  It may be done to treat conditions such as arthritis, gout, or carpal tunnel syndrome  The injections may be given in your knee, ankle, shoulder, elbow, wrist, ankle or sacroiliac joint  Do not apply heat to any area that is numb  If you have discomfort or soreness at the injection site, you may apply ice today, 20 minutes on and 20 minutes off  Tomorrow you may use ice or warm, moist heat  Do not apply ice or heat directly to the skin  You may have an increase or change in the discomfort for 36-48 hours after your treatment  Apply ice and continue with any pain medicine you have been prescribed  Do not do anything strenuous today  You may shower, but no tub baths or hot tubs today  You may resume your normal activities tomorrow, but do not “overdo it”  Resume normal activities slowly when you are feeling better  If you experience redness, drainage or swelling at the injection site, or if you develop a fever above 100 degrees, please call [facility] at [phone] or go to the Emergency Room  Continue to take all routine medicines prescribed by your primary care physician unless otherwise instructed by our staff  Most blood thinners should be started again according to your regularly scheduled dosing  If you have any questions, please give our office a call  As no general anesthesia was used in today's procedure, you should not experience any side effects related to anesthesia  If you are diabetic, the steroids used in today's injection may temporarily increase your blood sugar levels after the first few days after your injection   Please keep a close eye on your sugars and alert the doctor who manages your diabetes if your sugars are significantly high from your baseline or you are symptomatic  If you have a problem specifically related to your procedure, please call our office at [phone]  Problems not related to your procedure should be directed to your primary care physician  Mart-1 - Positive Histology Text: MART-1 staining demonstrates areas of higher density and clustering of melanocytes with Pagetoid spread upwards within the epidermis. The surgical margins are positive for tumor cells.

## 2023-10-24 ENCOUNTER — EVALUATION (OUTPATIENT)
Dept: PHYSICAL THERAPY | Facility: CLINIC | Age: 78
End: 2023-10-24
Payer: MEDICARE

## 2023-10-24 DIAGNOSIS — M51.16 LUMBAR DISC DISEASE WITH RADICULOPATHY: ICD-10-CM

## 2023-10-24 DIAGNOSIS — M48.062 SPINAL STENOSIS OF LUMBAR REGION WITH NEUROGENIC CLAUDICATION: ICD-10-CM

## 2023-10-24 DIAGNOSIS — Z98.890 HISTORY OF LUMBAR SURGERY: ICD-10-CM

## 2023-10-24 PROCEDURE — 97110 THERAPEUTIC EXERCISES: CPT

## 2023-10-24 PROCEDURE — 97161 PT EVAL LOW COMPLEX 20 MIN: CPT

## 2023-10-24 NOTE — PROGRESS NOTES
PT Evaluation     Today's date: 10/24/2023  Patient name: Latrice Peter  : 1945  MRN: [id number]  Referring provider: Sawyer Vuong DO  Dx:   Encounter Diagnosis     ICD-10-CM    1. Spinal stenosis of lumbar region with neurogenic claudication  M48.062 Ambulatory referral to Physical Therapy      2. History of lumbar surgery  Z98.890 Ambulatory referral to Physical Therapy      3. Lumbar disc disease with radiculopathy  M51.16 Ambulatory referral to Physical Therapy                     Assessment  Assessment details: Latrice Peter is a 70 y.o. female who presents to hospital-based outpatient PT with chronic low back pain with referred symptoms into either leg, left > right. Patient also has a history of lumbar fusion a few years ago. Patient presents with the following impairments: low back pain, referred symptoms into left thigh, limited LE strength, limited core strength, and posture/gait dysfunction. Due to these impairments, patient has difficulty performing the following: ADL's, recreational activities, engaging in social activities, ambulation, stair negotiation, lifting/carrying, self-care activities, prolonged standing, bending forward, putting on/taking off shoes/socks, household chores, and stair negotiation. Patient has been educated in home exercise program and plan of care. Patient would benefit from skilled physical therapy services to address the above functional limitations and progress towards prior level of function and independence with home exercise program.  Impairments: abnormal gait, abnormal muscle firing, abnormal or restricted ROM, activity intolerance, impaired physical strength, lacks appropriate home exercise program, pain with function, poor posture  and poor body mechanics  Understanding of Dx/Px/POC: good   Prognosis: good    Goals  Short Term Goals [3 weeks; target date: 23]  1. Patient will be independent with initial HEP.   2. Patient will demonstrate an increase in lumbar AROM by 10% in all planes. 3. Patient will demonstrate an increase in B/L LE strength of 1/2 grade on MMT. Long Term Goals [6 weeks; target date: 12/31/23]  1. Patient will be independent with comprehensive HEP. 2. Patient will demonstrate an increase in lumbar AROM to WNL in order to promote self-care activities pain-free. 3. Patient will demonstrate an increase in B/L LE strength to 4/5 on MMT. 4. Patient will be able to ambulate 300 feet on even terrain with use of SPC without gait deviation. Plan  Plan details: Patient has verbalized understanding and agreement with insurance verification form. Patient would benefit from: skilled physical therapy  Planned modality interventions: cryotherapy, electrical stimulation/Russian stimulation, TENS, ultrasound, thermotherapy: hydrocollator packs, traction, high voltage pulsed current: spasm management and high voltage pulsed current: pain management  Planned therapy interventions: flexibility, functional ROM exercises, graded exercise, home exercise program, joint mobilization, manual therapy, neuromuscular re-education, patient education, strengthening, stretching, therapeutic exercise, therapeutic activities, balance/weight bearing training, gait training, abdominal trunk stabilization, self care, postural training, activity modification, ADL retraining, ADL training, balance, behavior modification, body mechanics training, breathing training, therapeutic training, transfer training, graded activity, graded motor, muscle pump exercises, Contreras taping and IADL retraining  Frequency: 1-2x/week. Duration in weeks: 6  Plan of Care beginning date: 10/24/2023  Plan of Care expiration date: 12/31/2023  Treatment plan discussed with: patient    Subjective Evaluation    History of Present Illness  Mechanism of injury: Pt reports continued back pain since around 3/2023 after falling over a piece of concrete.  Pt performed physical therapy at this facility for the majority of 2023 and stopped due to limited progress. Pt states that she has had 3 epidural injections in her low back, which provided some relief. Pt reports history of lumbar fusion. Pt states that she returned to pain management in Oct 2023 and was referred to PT. Pt states that she wants to avoid having back surgery. Pt states that her standing tolerance is limited. Pt states that she has difficulty putting on her shoes and socks. Pt also reports that she avoids going out because she fears that if she fell while alone, there would be no one to help her.    Patient Goals  Patient goal: Walking without assistance  Pain  Current pain ratin  At best pain ratin  At worst pain rating: 10 ("over 10")  Location: Low back and left leg  Quality: radiating and dull ache  Relieving factors: rest  Aggravating factors: standing, walking, stair climbing and lifting  Progression: worsening    Social Support  Steps to enter house: yes  1  Lives with: alone    Employment status: not working  Hand dominance: right  Exercise history: Not much      Diagnostic Tests  MRI studies: abnormal (Foraminal narrowing left L4-5; bilaterally L5-S1)  Treatments  Previous treatment: physical therapy, injection treatment and medication  Current treatment: medication      Objective    Neurological Testing  Light Touch Sensation - intact throughout BLE    Lumbar AROM     Flexion  Fingertip reach to malleoli   Extension 30% with low back stiffness   L side glide 50% w/ near LOB   R side glide 50% w/ near LOB   L rotation 60% w/ near LOB   R rotation  60% w/ near LOB     Repeated motions Position Repetitions performed Symptomatic response during Symptomatic response after   Flexion Standing 5 No effect No effect   Extension Standing 5 Increased LBP No worse       Lower Extremity Strength Testing    Hip MMT  Left  Right   Flexion  4-/5 4/5    Abduction 4/5 4/5   Adduction 4/5 4/5     Knee MMT  Left  Right   Flexion 5/5 5/5    Extension 5/5 4/5     Ankle MMT  Left  Right   Dorsiflexion  4-/5 4-/5    Plantarflexion 5/5 5/5      Ambulation  Ambulates with forward flexed posture and significant thoracic kyphosis while gazing downward and with shortened step length         Insurance:  AMA/CMS Eval/ Re-eval POC expires FOTO Auth Status Co-Insurance   CMS - [facility] 10/24/23 12/31/23  None req No                                            1.  10/24 2.  3.  4.  5.  6.    7.  8.  9.  10.  11.  12.    13.  14.  15.  16.  17.  18.    19.  20. 21. 22. 23. 24.    25.  26. 27. 28. 29. 30.   31. 32.  33. 34. 35. 36.        Precautions: Hx of lumbar (fusion?) surgery ~2020, hx of L TKA, neuropathy in both feet, FALL RISK       EVAL 2 3 4 5 6   Manuals 10/24/23        STM/MFR         Manual flexibility          Joint mobs          Neuro Re-Ed         TA activation         Glute sets         Bridging         Clamshells                                    Ther Ex         Seated forward trunk flexion 10x5s         DKTC                                                               Ther Activity         Pt education POC, HEP        Stairs         Squats         Gait                                    Modalities                             Access Code: [email or web address]  URL: [email or web address]  Date: 10/24/2023  Prepared by: Barbara Thurman    Exercises  - Seated Flexion Stretch  - 3 x daily - 7 x weekly - 1 sets - 10 reps - 5 hold

## 2023-10-31 ENCOUNTER — OFFICE VISIT (OUTPATIENT)
Dept: PHYSICAL THERAPY | Facility: CLINIC | Age: 78
End: 2023-10-31
Payer: MEDICARE

## 2023-10-31 DIAGNOSIS — Z98.890 HISTORY OF LUMBAR SURGERY: ICD-10-CM

## 2023-10-31 DIAGNOSIS — M48.062 SPINAL STENOSIS OF LUMBAR REGION WITH NEUROGENIC CLAUDICATION: Primary | ICD-10-CM

## 2023-10-31 DIAGNOSIS — M51.16 LUMBAR DISC DISEASE WITH RADICULOPATHY: ICD-10-CM

## 2023-10-31 PROCEDURE — 97110 THERAPEUTIC EXERCISES: CPT

## 2023-10-31 PROCEDURE — 97112 NEUROMUSCULAR REEDUCATION: CPT

## 2023-10-31 NOTE — PROGRESS NOTES
Daily Note      Today's date: 10/31/2023  Patient name: Sumi Cavanaugh  : 1945  MRN: [id number]  Referring provider: Jeff Mercedes DO  Dx:   Encounter Diagnosis     ICD-10-CM    1. Spinal stenosis of lumbar region with neurogenic claudication  M48.062       2. History of lumbar surgery  Z98.890       3. Lumbar disc disease with radiculopathy  M51.16           Subjective: Pt reports that her back is feeling "pretty good" today. Pt states that she has been performing her HEP. Objective: See treatment diary below    Assessment: Pt tolerated treatment well. Pt was able to perform seated trunk flexion without increase in symptoms. Pt introduced to seated sciatic nerve glide and noted tightness on ipsilateral hamstrings to calf. Pt introduced to supine hip strengthening and noted no increase in symptoms. Pt requires minimal assistance for bed mobility from supine to sitting. Plan: Continue per plan of care. Progress treatment as tolerated.           Insurance:  AMA/CMS Eval/ Re-eval POC expires FOTO Auth Status Co-Insurance   CMS - [facility] 10/24/23 12/31/23  None req No                                            1.  10/24 2.   10/31 3.  4.  5.  6.    7.  8.  9.  10.  11.  12.    13.  14.  15.  16.  17.  18.    19.  20. 21. 22. 23. 24.    25.  26. 27. 28. 29. 30.   31. 32.  33. 34. 35. 36.        Precautions: Hx of lumbar (fusion?) surgery ~, hx of L TKA, neuropathy in both feet, FALL RISK       EVAL 2 3 4 5 6   Manuals 10/24/23 10/31/23       STM/MFR         Manual flexibility          Joint mobs          Neuro Re-Ed         TA activation         Glute sets  2x10        Hip add iso  2x10 (5s)        Clamshells  Supine 20x GTB                                  Ther Ex         Nustep  6' L1        Seated forward trunk flexion 10x5s  2x10 (5s)        Seated sciatic nerve glide  2x10 (5s) B/L                                                             Ther Activity         Pt education POC, HEP Stairs         Squats         Gait                                    Modalities         MHP lumbar  10' seated after Nustep                  Access Code: [email or web address]  URL: [email or web address]  Date: 10/24/2023  Prepared by: Melba Vergara    Exercises  - Seated Flexion Stretch  - 3 x daily - 7 x weekly - 1 sets - 10 reps - 5 hold

## 2023-11-07 ENCOUNTER — OFFICE VISIT (OUTPATIENT)
Dept: PHYSICAL THERAPY | Facility: CLINIC | Age: 78
End: 2023-11-07
Payer: MEDICARE

## 2023-11-07 ENCOUNTER — OFFICE VISIT (OUTPATIENT)
Dept: PAIN MEDICINE | Facility: CLINIC | Age: 78
End: 2023-11-07
Payer: MEDICARE

## 2023-11-07 VITALS
DIASTOLIC BLOOD PRESSURE: 70 MMHG | BODY MASS INDEX: 34.58 KG/M2 | SYSTOLIC BLOOD PRESSURE: 133 MMHG | WEIGHT: 183 LBS | HEART RATE: 70 BPM

## 2023-11-07 DIAGNOSIS — M51.16 LUMBAR DISC DISEASE WITH RADICULOPATHY: ICD-10-CM

## 2023-11-07 DIAGNOSIS — M48.062 SPINAL STENOSIS OF LUMBAR REGION WITH NEUROGENIC CLAUDICATION: ICD-10-CM

## 2023-11-07 DIAGNOSIS — M54.16 LUMBAR RADICULOPATHY: ICD-10-CM

## 2023-11-07 DIAGNOSIS — M48.062 SPINAL STENOSIS OF LUMBAR REGION WITH NEUROGENIC CLAUDICATION: Primary | ICD-10-CM

## 2023-11-07 DIAGNOSIS — Z98.890 HISTORY OF LUMBAR SURGERY: ICD-10-CM

## 2023-11-07 DIAGNOSIS — M54.32 SCIATICA OF LEFT SIDE: ICD-10-CM

## 2023-11-07 PROCEDURE — 97110 THERAPEUTIC EXERCISES: CPT

## 2023-11-07 PROCEDURE — 99214 OFFICE O/P EST MOD 30 MIN: CPT | Performed by: PHYSICAL MEDICINE & REHABILITATION

## 2023-11-07 PROCEDURE — 97112 NEUROMUSCULAR REEDUCATION: CPT

## 2023-11-07 RX ORDER — PREGABALIN 25 MG/1
25 CAPSULE ORAL 2 TIMES DAILY
Qty: 60 CAPSULE | Refills: 1 | Status: SHIPPED | OUTPATIENT
Start: 2023-11-07

## 2023-11-07 RX ORDER — DULOXETIN HYDROCHLORIDE 60 MG/1
60 CAPSULE, DELAYED RELEASE ORAL DAILY
Qty: 30 CAPSULE | Refills: 2 | Status: SHIPPED | OUTPATIENT
Start: 2023-11-07

## 2023-11-07 NOTE — PROGRESS NOTES
Pain Medicine Follow-Up Note    Assessment:  1. Spinal stenosis of lumbar region with neurogenic claudication    2. Lumbar disc disease with radiculopathy    3. History of lumbar surgery    4. Lumbar radiculopathy    5. Sciatica of left side        Plan:  Ms. Remigio Gamez is a pleasant 61-year-old female presents to [facility] for follow-up and reevaluation regarding ongoing low back pain with rating symptoms into bilateral lower extremities. Previously performed L5-S1 LESI under fluoroscopy guidance August 25, 2023 which only provided minimal relief for short period of time and at last office visit October 3, 2023 we started her on Cymbalta 30 mg daily which she reports is providing some relief in her pain. At this time we will increase her Cymbalta to 60 mg daily and continue with Lyrica 25 mg twice a day. All questions answered, patient is agreeable with plan. Hopefully the increase in Cymbalta will continue to provide improving relief in her neuropathic pain. We will see her back in 3 months. History of Present Illness:    Kong Ridley is a 70 y.o. female who presents to [address] [place] [address] and [facility] for interval re-evaluation of the above stated pain complaints. The patient has a past medical and chronic pain history as outlined in the assessment section. Patient presents for follow-up and reevaluation regarding ongoing low back pain with rating symptoms into left lower extremity currently rated 5-7 out of 10 and interfere with activities. Pain is described as an intermittent throbbing, shooting, aching sensation that is worse in the morning. Presents today for follow-up and reevaluation. Other than as stated above, the patient denies any interval changes in medications, medical condition, mental condition, symptoms, or allergies since the last office visit. Review of Systems:    Review of Systems   Constitutional:  Negative for unexpected weight change.    HENT: Negative for ear pain. Eyes:  Negative for visual disturbance. Respiratory:  Negative for shortness of breath and wheezing. Gastrointestinal:  Negative for abdominal pain. Musculoskeletal:  Positive for back pain and gait problem. Decreased ROM, Joint pain and muscle pain   Neurological:  Positive for weakness and numbness. Psychiatric/Behavioral:  Negative for decreased concentration. Past Medical History:   Diagnosis Date    Arthritis     Fibromyalgia, primary     Sciatica        Past Surgical History:   Procedure Laterality Date    COLON SURGERY      colostomy    EPIDURAL BLOCK INJECTION N/A 08/25/2023    Procedure: L5-S1 LUMBAR epidural steroid injection (95436); Surgeon: Jazmyn Rojo DO;  Location: [facility] MAIN OR;  Service: Pain Management     DE INJECT SI JOINT ARTHRGRPHY&/ANES/STEROID W/PRIMO Bilateral 06/09/2023    Procedure: SACROILIAC joint injection (32033 );   Surgeon: Siri Deras MD;  Location: [facility] MAIN OR;  Service: Pain Management     SPINAL FUSION      SPINE SURGERY         Family History   Problem Relation Age of Onset    No Known Problems Mother     No Known Problems Father        Social History     Occupational History    Not on file   Tobacco Use    Smoking status: Former     Types: Cigarettes    Smokeless tobacco: Never   Substance and Sexual Activity    Alcohol use: Never    Drug use: Never    Sexual activity: Never         Current Outpatient Medications:     candesartan (ATACAND) 32 MG tablet, Take 32 mg by mouth daily, Disp: , Rfl:     carvedilol (COREG) 12.5 mg tablet, Take 12.5 mg by mouth 2 (two) times a day with meals, Disp: , Rfl:     DULoxetine (CYMBALTA) 60 mg delayed release capsule, Take 1 capsule (60 mg total) by mouth daily, Disp: 30 capsule, Rfl: 2    irbesartan (AVAPRO) 150 mg tablet, , Disp: , Rfl:     Multiple Vitamin (multivitamin) tablet, Take 1 tablet by mouth daily, Disp: , Rfl:     pantoprazole (PROTONIX) 40 mg tablet, , Disp: , Rfl:     prednisoLONE 5 MG (21) TBPK, , Disp: , Rfl:     pregabalin (LYRICA) 25 mg capsule, Take 1 capsule (25 mg total) by mouth 2 (two) times a day, Disp: 60 capsule, Rfl: 1    zolpidem (AMBIEN) 5 mg tablet, , Disp: , Rfl:     bacitracin topical ointment 500 units/g topical ointment, Apply 1 large application topically 2 (two) times a day, Disp: 28 g, Rfl: 0    calcium carbonate (OS-HUE) 600 MG tablet, Take 600 mg by mouth daily (Patient not taking: Reported on 11/7/2023), Disp: , Rfl:     meloxicam (MOBIC) 7.5 mg tablet, , Disp: , Rfl:     Myrbetriq 50 MG TB24, 25 mg, Disp: , Rfl:     traMADol (ULTRAM) 50 mg tablet, , Disp: , Rfl:     No Known Allergies    Physical Exam:    /70   Pulse 70   Wt 83 kg (183 lb)   BMI 34.58 kg/m²     Constitutional:normal, well developed, well nourished, alert, in no distress and non-toxic and no overt pain behavior. Eyes:anicteric  HEENT:grossly intact  Neck:supple, symmetric, trachea midline and no masses   Pulmonary:even and unlabored  Cardiovascular:No edema or pitting edema present  Skin:Normal without rashes or lesions and well hydrated  Psychiatric:Mood and affect appropriate  Neurologic:Cranial Nerves II-XII grossly intact  Musculoskeletal:normal and ambulates with rolling walker      Imaging  No orders to display         No orders of the defined types were placed in this encounter.

## 2023-11-07 NOTE — PROGRESS NOTES
Daily Note      Today's date: 2023  Patient name: Bert Frias  : 1945  MRN: [id number]  Referring provider: Debbie Light DO  Dx:   Encounter Diagnosis     ICD-10-CM    1. Spinal stenosis of lumbar region with neurogenic claudication  M48.062       2. History of lumbar surgery  Z98.890       3. Lumbar disc disease with radiculopathy  M51.16           Subjective: Pt reports that she just came from pain management. Pt states that her dosage of Cymbalta was increased and her Rx for Lyrica was renewed. Pt states she will follow up again in January. Objective: See treatment diary below    Assessment: Pt tolerated treatment well. Pt performed increased time on Nustep this visit and tolerated well. Pt noted improvement of back pain from 5/10 to 4/10 by the end of the session. Plan to introduce functional standing exercise next visit. Plan: Continue per plan of care. Progress treatment as tolerated.           Insurance:  AMA/CMS Eval/ Re-eval POC expires FOTO Auth Status Co-Insurance   [facility] 10/24/23 12/31/23  None req No                                            1.  10/24 2.   10/31 3.    4.  5.  6.    7.  8.  9.  10.  11.  12.    13.  14.  15.  16.  17.  18.    19.  20. 21. 22. 23. 24.    25.  26. 27. 28. 29. 30.   31. 32.  33. 34. 35. 36.        Precautions: Hx of lumbar (fusion?) surgery ~, hx of L TKA, neuropathy in both feet, FALL RISK       EVAL 2 3 4 5 6   Manuals 10/24/23 10/31/23 11/7/23      STM/MFR         Manual flexibility          Joint mobs          Neuro Re-Ed         TA activation         Glute sets  2x10  2x10 (5s)       Hip add iso  2x10 (5s)  2x10 (5s)       Clamshells  Supine 20x GTB Supine 20x GTB      Standing march         Standing hip abd         Standing hip ext                  Ther Ex         Nustep  6' L1  8' L1      Seated forward trunk flexion 10x5s  2x10 (5s)  2x10 (5s)       Seated sciatic nerve glide  2x10 (5s) B/L 2x10 (5s) B/L Ther Activity         Pt education POC, HEP        Stairs         Squats         Gait                                    Modalities         MHP lumbar  10' seated after Nustep 10' seated pre-tx                  Access Code: [email or web address]  URL: [email or web address]  Date: 10/24/2023  Prepared by: Sarah Zaragoza    Exercises  - Seated Flexion Stretch  - 3 x daily - 7 x weekly - 1 sets - 10 reps - 5 hold

## 2023-11-14 ENCOUNTER — OFFICE VISIT (OUTPATIENT)
Dept: PHYSICAL THERAPY | Facility: CLINIC | Age: 78
End: 2023-11-14
Payer: MEDICARE

## 2023-11-14 DIAGNOSIS — M51.16 LUMBAR DISC DISEASE WITH RADICULOPATHY: ICD-10-CM

## 2023-11-14 DIAGNOSIS — M48.062 SPINAL STENOSIS OF LUMBAR REGION WITH NEUROGENIC CLAUDICATION: Primary | ICD-10-CM

## 2023-11-14 DIAGNOSIS — Z98.890 HISTORY OF LUMBAR SURGERY: ICD-10-CM

## 2023-11-14 PROCEDURE — 97110 THERAPEUTIC EXERCISES: CPT

## 2023-11-14 PROCEDURE — 97140 MANUAL THERAPY 1/> REGIONS: CPT

## 2023-11-14 NOTE — PROGRESS NOTES
Daily Note      Today's date: 2023  Patient name: Mina Castillo  : 1945  MRN: [id number]  Referring provider: Aydee Manrique DO  Dx:   Encounter Diagnosis     ICD-10-CM    1. Spinal stenosis of lumbar region with neurogenic claudication  M48.062       2. History of lumbar surgery  Z98.890       3. Lumbar disc disease with radiculopathy  M51.16           Subjective: Pt reports increased pain in the left hip and down the back of the left thigh, past the left knee. Pt denies any activity change over the past several days. Pt asks if massage would help. Objective: See treatment diary below    Assessment: Pt tolerated treatment fair. Educated pt regarding POC for active exercise as primary intervention. Performed IASTM stick rolling on left glute medius/ITB and pt noted tenderness in the area. Performed LAD on each LE and pt presents with hypomobility bilaterally. Excursion may also be limited due to history of lumbar fusion. Discussed with pt that she has potential to benefit from PT 2x/week. Plan to re-assess next visit and make change to POC. Plan: Continue per plan of care. Progress treatment as tolerated.           Insurance:  AMA/CMS Eval/ Re-eval POC expires FOTO Auth Status Co-Insurance   CMS - [facility] 10/24/23 12/31/23  None req No                                            1.  10/24 2.   10/31 3.    4.    5.  6.    7.  8.  9.  10.  11.  12.    13.  14.  15.  16.  17.  18.    19.  20. 21. 22. 23. 24.    25.  26. 27. 28. 29. 30.   31. 32.  33. 34. 35. 36.        Precautions: Hx of lumbar (fusion?) surgery ~, hx of L TKA, neuropathy in both feet, FALL RISK       EVAL 2 3 4 5 6   Manuals 10/24/23 10/31/23 11/7/23 11/14/23     STM/MFR    IASTM L glute medius, TFL, ITB     Manual flexibility          Joint mobs     B/L LAD gr III     Neuro Re-Ed         TA activation         Glute sets  2x10  2x10 (5s)  2x10 (5s)      Hip add iso  2x10 (5s)  2x10 (5s)  2x10 (5s) Clamshells  Supine 20x GTB Supine 20x GTB Supine 20x GTB     Standing march         Standing hip abd         Standing hip ext                  Ther Ex         Nustep  6' L1  8' L1 6' L1     Seated forward trunk flexion 10x5s  2x10 (5s)  2x10 (5s)  2x10 (5s)      Seated sciatic nerve glide  2x10 (5s) B/L 2x10 (5s) B/L  2x10 (5s) B/L                                                            Ther Activity         Pt education POC, HEP   Reviewed POC     Stairs         Squats         Gait                                    Modalities         MHP lumbar  10' seated after Nustep 10' seated pre-tx  10' seated after Nustep                Access Code: [email or web address]  URL: [email or web address]  Date: 10/24/2023  Prepared by: Eric Naik    Exercises  - Seated Flexion Stretch  - 3 x daily - 7 x weekly - 1 sets - 10 reps - 5 hold

## 2023-11-21 ENCOUNTER — EVALUATION (OUTPATIENT)
Dept: PHYSICAL THERAPY | Facility: CLINIC | Age: 78
End: 2023-11-21
Payer: MEDICARE

## 2023-11-21 DIAGNOSIS — Z98.890 HISTORY OF LUMBAR SURGERY: ICD-10-CM

## 2023-11-21 DIAGNOSIS — M48.062 SPINAL STENOSIS OF LUMBAR REGION WITH NEUROGENIC CLAUDICATION: Primary | ICD-10-CM

## 2023-11-21 DIAGNOSIS — M51.16 LUMBAR DISC DISEASE WITH RADICULOPATHY: ICD-10-CM

## 2023-11-21 PROCEDURE — 97112 NEUROMUSCULAR REEDUCATION: CPT

## 2023-11-21 PROCEDURE — 97110 THERAPEUTIC EXERCISES: CPT

## 2023-11-21 NOTE — PROGRESS NOTES
PT Re-Evaluation     Today's date: 2023  Patient name: Luis Jc  : 1945  MRN: [id number]  Referring provider: Bryant Temple DO  Dx:   Encounter Diagnosis     ICD-10-CM    1. Spinal stenosis of lumbar region with neurogenic claudication  M48.062       2. History of lumbar surgery  Z98.890       3. Lumbar disc disease with radiculopathy  M51.16                      Assessment  Assessment details: Luis Jc has been seen in hospital-based outpatient PT for 5 visits with chronic low back pain with referred symptoms into either leg, left > right. Patient demonstrates gradual progress toward short-term and long-term physical therapy goals. Patient presents with minimal progress in LE strength. Patient continues to present with the following impairments: low back pain, referred symptoms into left thigh/calf, limited LE strength, limited core strength, and posture/gait dysfunction. Due to these impairments, patient continues to have difficulty performing the following: ADL's, recreational activities, engaging in social activities, ambulation, stair negotiation, lifting/carrying, self-care activities, prolonged standing, bending forward, putting on/taking off shoes/socks, household chores, and stair negotiation. Patient has been educated in updated plan of care. Patient would benefit from continued skilled physical therapy services to address the above functional limitations and progress towards prior level of function and independence with home exercise program.  Impairments: abnormal gait, abnormal muscle firing, abnormal or restricted ROM, activity intolerance, impaired physical strength, lacks appropriate home exercise program, pain with function, poor posture  and poor body mechanics  Understanding of Dx/Px/POC: good   Prognosis: good    Goals  Short Term Goals [3 weeks; target date: 23]  1. Patient will be independent with initial HEP. - goal met  2.  Patient will demonstrate an increase in lumbar AROM by 10% in all planes. - in progress  3. Patient will demonstrate an increase in B/L LE strength of 1/2 grade on MMT. - in progress    Long Term Goals [6 weeks; target date: 12/31/23]  1. Patient will be independent with comprehensive HEP. - in progress  2. Patient will demonstrate an increase in lumbar AROM to WNL in order to promote self-care activities pain-free. - in progress  3. Patient will demonstrate an increase in B/L LE strength to 4/5 on MMT. - in progress  4. Patient will be able to ambulate 300 feet on even terrain with use of SPC without gait deviation. - in progress    Plan  Plan details: The focus of the updated plan of care will be on strengthening, functional activities, and improving gait mechanics and ambulation endurance.    Patient would benefit from: skilled physical therapy  Planned modality interventions: cryotherapy, electrical stimulation/Russian stimulation, TENS, ultrasound, thermotherapy: hydrocollator packs, traction, high voltage pulsed current: spasm management and high voltage pulsed current: pain management  Planned therapy interventions: flexibility, functional ROM exercises, graded exercise, home exercise program, joint mobilization, manual therapy, neuromuscular re-education, patient education, strengthening, stretching, therapeutic exercise, therapeutic activities, balance/weight bearing training, gait training, abdominal trunk stabilization, self care, postural training, activity modification, ADL retraining, ADL training, balance, behavior modification, body mechanics training, breathing training, therapeutic training, transfer training, graded activity, graded motor, muscle pump exercises, Contreras taping and IADL retraining  Frequency: 2x week  Duration in weeks: 4  Plan of Care beginning date: 11/21/2023  Plan of Care expiration date: 1/21/2024  Treatment plan discussed with: patient    Subjective Evaluation    History of Present Illness  Mechanism of injury: Pt reports continued back pain since around 3/2023 after falling over a piece of concrete. Pt performed physical therapy at this facility for the majority of 2023 and stopped due to limited progress. Pt states that she has had 3 epidural injections in her low back, which provided some relief. Pt reports history of lumbar fusion. Pt states that she returned to pain management in Oct 2023 and was referred to PT. Pt states that she wants to avoid having back surgery. Pt states that her standing tolerance is limited. Pt states that she has difficulty putting on her shoes and socks. Pt also reports that she avoids going out because she fears that if she fell while alone, there would be no one to help her.     (23) Pt reports 50% improvement since starting PT. Pt states that her stamina has improved and she is able to get more done. In order to return to 100% progress, pt states she must be rid of pain in the low back and into the left leg. Pt states that today, she has more pain in the back and left leg than usual, that travels to the left calf. Pt states that one of her primary goals remains to be able to walk better.    Patient Goals  Patient goal: Walking without assistance  Pain  Current pain ratin  At best pain rating: 3  At worst pain ratin  Location: Low back and left leg  Quality: radiating and dull ache  Relieving factors: rest  Aggravating factors: standing, walking, stair climbing and lifting  Progression: worsening    Social Support  Steps to enter house: yes  1  Lives with: alone    Employment status: not working  Hand dominance: right  Exercise history: Not much      Diagnostic Tests  MRI studies: abnormal (Foraminal narrowing left L4-5; bilaterally L5-S1)  Treatments  Previous treatment: physical therapy, injection treatment and medication  Current treatment: medication      Objective    Neurological Testing  Light Touch Sensation - intact throughout BLE    Lumbar AROM Flexion  Fingertip reach to malleoli   Extension 30% with low back stiffness   L side glide 50%   R side glide 50% w/ inc L posterior thigh pain   L rotation 60%    R rotation  60%      Repeated motions Position Repetitions performed Symptomatic response during Symptomatic response after   Flexion Standing 5 No effect No effect   Extension Standing 5 Increased LBP No worse       Lower Extremity Strength Testing    Hip MMT  Left  Right   Flexion  4-/5 4+/5    Abduction 4/5 4/5   Adduction 4/5 4/5     Knee MMT  Left  Right   Flexion  5/5 5/5    Extension 5/5 4/5     Ankle MMT  Left  Right   Dorsiflexion  4-/5 4-/5    Plantarflexion 5/5 5/5      Ambulation  Ambulates with forward flexed posture and significant thoracic kyphosis while gazing downward and with shortened step length           Insurance:  AMA/CMS Eval/ Re-eval POC expires FOTO Auth Status Co-Insurance   CMS - [facility] 10/24/23 12/31/23  None req No                                            1.  10/24 2.   10/31 3.   11/7 4.   11/14 5.   11/21 RE 6.    7.  8.  9.  10.  11.  12.    13.  14.  15.  16.  17.  18.    19.  20. 21. 22. 23. 24.    25.  26. 27. 28. 29. 30.   31. 32.  33. 34. 35. 36.        Precautions: Hx of lumbar (fusion?) surgery ~2020, hx of L TKA, neuropathy in both feet, FALL RISK       EVAL 2 3 4 5 (RE-EVAL) 6   Manuals 10/24/23 10/31/23 11/7/23 11/14/23 11/21/23    STM/MFR    IASTM L glute medius, TFL, ITB     Manual flexibility          Joint mobs     B/L LAD gr III     Neuro Re-Ed         TA activation         Glute sets  2x10  2x10 (5s)  2x10 (5s)  2x10 (5s)     Hip add iso  2x10 (5s)  2x10 (5s)  2x10 (5s)  2x10 (5s)    Clamshells  Supine 20x GTB Supine 20x GTB Supine 20x GTB     Standing march         Standing hip abd     10x B/L *pain w/ RLE hip abd    Standing hip ext                  Ther Ex         Nustep  6' L1  8' L1 6' L1 8' L1    Seated forward trunk flexion 10x5s  2x10 (5s)  2x10 (5s)  2x10 (5s)  2x10 (5s)     Seated sciatic nerve glide  2x10 (5s) B/L 2x10 (5s) B/L  2x10 (5s) B/L  2x10 (5s) B/L    Heel raises     2x10 standing                                                 Ther Activity         Pt education POC, HEP   Reviewed POC Updated POC    Stairs         Squats         Gait                                    Modalities         MHP lumbar  10' seated after Nustep 10' seated pre-tx  10' seated after Nustep 5' seated after Nustep               Access Code: [email or web address]  URL: [email or web address]  Date: 10/24/2023  Prepared by: Sally Smallwood    Exercises  - Seated Flexion Stretch  - 3 x daily - 7 x weekly - 1 sets - 10 reps - 5 hold

## 2023-11-28 ENCOUNTER — OFFICE VISIT (OUTPATIENT)
Dept: PHYSICAL THERAPY | Facility: CLINIC | Age: 78
End: 2023-11-28
Payer: MEDICARE

## 2023-11-28 DIAGNOSIS — Z98.890 HISTORY OF LUMBAR SURGERY: ICD-10-CM

## 2023-11-28 DIAGNOSIS — M48.062 SPINAL STENOSIS OF LUMBAR REGION WITH NEUROGENIC CLAUDICATION: Primary | ICD-10-CM

## 2023-11-28 DIAGNOSIS — M51.16 LUMBAR DISC DISEASE WITH RADICULOPATHY: ICD-10-CM

## 2023-11-28 PROCEDURE — 97112 NEUROMUSCULAR REEDUCATION: CPT

## 2023-11-28 PROCEDURE — 97110 THERAPEUTIC EXERCISES: CPT

## 2023-11-28 NOTE — PROGRESS NOTES
Daily Note      Today's date: 2023  Patient name: Kong Ridley  : 1945  MRN: [id number]  Referring provider: Waymon Holter, DO  Dx:   Encounter Diagnosis     ICD-10-CM    1. Spinal stenosis of lumbar region with neurogenic claudication  M48.062       2. History of lumbar surgery  Z98.890       3. Lumbar disc disease with radiculopathy  M51.16           Subjective: Pt reports soreness in the low back, however does not attribute this to exercises performed last session. Objective: See treatment diary below    Assessment: Pt tolerated treatment well. Pt introduced to sit-to-stands and required minor use of hands to perform this from 23" plinth. Pt also introduced to standing hip extension while holding bar. Pt required verbal cuing in order to prevent use of trunk compensation during standing hip abduction and extension. Reviewed POC with pt as she will begin attending 2x/week and progressing functional strength. Plan: Continue per plan of care. Progress treatment as tolerated.           Insurance:  AMA/CMS Eval/ Re-eval POC expires FOTO Auth Status Co-Insurance   CMS - [facility] 10/24/23 12/31/23  None req No    23                                       1.  10/24 2.   10/31 3.    4.    5.    RE 6.      7.  8.  9.  10.  11.  12.    13.  14.  15.  16.  17.  18.    19.  20. 21. 22. 23. 24.    25.  26. 27. 28. 29. 30.   31. 32.  33. 34. 35. 36.        Precautions: Hx of lumbar (fusion?) surgery ~, hx of L TKA, neuropathy in both feet, FALL RISK       EVAL 2 3 4 5 (RE-EVAL) 6   Manuals 10/24/23 10/31/23 11/7/23 11/14/23 11/21/23 11/28/23   STM/MFR    IASTM L glute medius, TFL, ITB     Manual flexibility          Joint mobs     B/L LAD gr III     Neuro Re-Ed         TA activation         Glute sets  2x10  2x10 (5s)  2x10 (5s)  2x10 (5s)     Hip add iso  2x10 (5s)  2x10 (5s)  2x10 (5s)  2x10 (5s) 2x10 (5s)    Clamshells  Supine 20x GTB Supine 20x GTB Supine 20x GTB  Supine 20x GTB   Standing march         Standing hip abd     10x B/L *pain w/ RLE hip abd 10x B/L   Standing hip ext      10x B/L             Ther Ex         Nustep  6' L1  8' L1 6' L1 8' L1 8' L1    Seated forward trunk flexion 10x5s  2x10 (5s)  2x10 (5s)  2x10 (5s)  2x10 (5s)  2x10 (5s)    Seated sciatic nerve glide  2x10 (5s) B/L 2x10 (5s) B/L  2x10 (5s) B/L  2x10 (5s) B/L 2x10 B/L    Heel raises     2x10 standing    Sit-to-stand      2x10 from 23" plinth                                       Ther Activity         Pt education POC, HEP   Reviewed POC Updated POC Reviewed POC   Stairs         Squats         Gait                                    Modalities         MHP lumbar  10' seated after Nustep 10' seated pre-tx  10' seated after Nustep 5' seated after Nustep 5' seated, 5' while performing trunk flexion and sciatic nerve glide              Access Code: [email or web address]  URL: [email or web address]  Date: 10/24/2023  Prepared by: Wilbert Mata    Exercises  - Seated Flexion Stretch  - 3 x daily - 7 x weekly - 1 sets - 10 reps - 5 hold

## 2023-12-05 ENCOUNTER — OFFICE VISIT (OUTPATIENT)
Dept: PHYSICAL THERAPY | Facility: CLINIC | Age: 78
End: 2023-12-05
Payer: MEDICARE

## 2023-12-05 DIAGNOSIS — M48.062 SPINAL STENOSIS OF LUMBAR REGION WITH NEUROGENIC CLAUDICATION: Primary | ICD-10-CM

## 2023-12-05 DIAGNOSIS — M51.16 LUMBAR DISC DISEASE WITH RADICULOPATHY: ICD-10-CM

## 2023-12-05 DIAGNOSIS — Z98.890 HISTORY OF LUMBAR SURGERY: ICD-10-CM

## 2023-12-05 PROCEDURE — 97110 THERAPEUTIC EXERCISES: CPT

## 2023-12-05 PROCEDURE — 97112 NEUROMUSCULAR REEDUCATION: CPT

## 2023-12-05 NOTE — PROGRESS NOTES
Daily Note      Today's date: 2023  Patient name: Kong Ridley  : 1945  MRN: [id number]  Referring provider: Waymon Holter, DO  Dx:   Encounter Diagnosis     ICD-10-CM    1. Spinal stenosis of lumbar region with neurogenic claudication  M48.062       2. History of lumbar surgery  Z98.890       3. Lumbar disc disease with radiculopathy  M51.16           Subjective: Pt reports increased soreness throughout the lateral left hip to the lateral left knee. Pt is unable to recall a specific activity change that caused this. Objective: See treatment diary below    Assessment: Pt tolerated treatment fair. Pt noted mild relief of left hip and lateral thigh soreness with stick rolling to this musculature. Pt required verbal cuing to keep toes pointed forward during sidestepping in order to limit TFL compensation. Plan: Continue per plan of care. Progress treatment as tolerated.           Insurance:  A/CMS Eval/ Re-eval POC expires FOTO Auth Status Co-Insurance   CMS - [facility] 10/24/23 12/31/23  None req No    23                                       1.  10/24 2.   10/31 3.   11 4.    5.    RE 6.      7.  8.  9.  10.  11.  12.    13.  14.  15.  16.  17.  18.    19.  20. 21. 22. 23. 24.    25.  26. 27. 28. 29. 30.   31. 32.  33. 34. 35. 36.        Precautions: Hx of lumbar (fusion?) surgery ~, hx of L TKA, neuropathy in both feet, FALL RISK       3 4 5 (RE-EVAL) 6 7    Manuals 23    STM/MFR  IASTM L glute medius, TFL, ITB       Manual flexibility          Joint mobs   B/L LAD gr III       Neuro Re-Ed         TA activation         Glute sets 2x10 (5s)  2x10 (5s)  2x10 (5s)       Hip add iso 2x10 (5s)  2x10 (5s)  2x10 (5s) 2x10 (5s)      Clamshells Supine 20x GTB Supine 20x GTB  Supine 20x GTB     Standing march         Standing hip abd   10x B/L *pain w/ RLE hip abd 10x B/L     Standing hip ext    10x B/L      Lateral walks 6x15 ft    Ther Ex         Nustep 8' L1 6' L1 8' L1 8' L1  8' L1    Seated forward trunk flexion 2x10 (5s)  2x10 (5s)  2x10 (5s)  2x10 (5s)  2x10 (5s)     Seated sciatic nerve glide 2x10 (5s) B/L  2x10 (5s) B/L  2x10 (5s) B/L 2x10 B/L  2x10 B/L     Heel raises   2x10 standing      Sit-to-stand    2x10 from 23" plinth 2x10 from 22" plinth                                        Ther Activity         Pt education  Reviewed POC Updated POC Reviewed POC     Stairs         Squats         Gait                                    Modalities         MHP lumbar 10' seated pre-tx  10' seated after Nustep 5' seated after Nustep 5' seated, 5' while performing trunk flexion and sciatic nerve glide 5' seated, 5' while performing trunk flexion               Access Code: [email or web address]  URL: [email or web address]  Date: 10/24/2023  Prepared by: Wilbert Mata    Exercises  - Seated Flexion Stretch  - 3 x daily - 7 x weekly - 1 sets - 10 reps - 5 hold

## 2023-12-07 ENCOUNTER — OFFICE VISIT (OUTPATIENT)
Dept: PHYSICAL THERAPY | Facility: CLINIC | Age: 78
End: 2023-12-07
Payer: MEDICARE

## 2023-12-07 DIAGNOSIS — M48.062 SPINAL STENOSIS OF LUMBAR REGION WITH NEUROGENIC CLAUDICATION: Primary | ICD-10-CM

## 2023-12-07 DIAGNOSIS — Z98.890 HISTORY OF LUMBAR SURGERY: ICD-10-CM

## 2023-12-07 DIAGNOSIS — M51.16 LUMBAR DISC DISEASE WITH RADICULOPATHY: ICD-10-CM

## 2023-12-07 PROCEDURE — 97110 THERAPEUTIC EXERCISES: CPT

## 2023-12-07 PROCEDURE — 97112 NEUROMUSCULAR REEDUCATION: CPT

## 2023-12-07 NOTE — PROGRESS NOTES
Daily Note      Today's date: 2023  Patient name: Mela Magallon  : 1945  MRN: [id number]  Referring provider: Jazmyn Rojo DO  Dx:   Encounter Diagnosis     ICD-10-CM    1. Spinal stenosis of lumbar region with neurogenic claudication  M48.062       2. History of lumbar surgery  Z98.890       3. Lumbar disc disease with radiculopathy  M51.16           Subjective: Pt reports no recent change in symptoms in the low back. Objective: See treatment diary below    Assessment: Pt tolerated treatment well. Pt resumed standing active exercise and tolerated well. Pt required verbal cuing and demonstration to decrease trunk compensation during hip extension and abduction. Plan: Continue per plan of care. Progress treatment as tolerated.           Insurance:  A/CMS Eval/ Re-eval POC expires FOTO Auth Status Co-Insurance   CMS - [facility] 10/24/23 12/31/23  None req No    23                                       1.  10/24 2.   10/31 3.    4.    5.    RE 6.      7.   12 8.    9.  10.  11.  12.    13.  14.  15.  16.  17.  18.    19.  20. 21. 22. 23. 24.    25.  26. 27. 28. 29. 30.   31. 32.  33. 34. 35. 36.        Precautions: Hx of lumbar (fusion?) surgery ~, hx of L TKA, neuropathy in both feet, FALL RISK       3 4 5 (RE-EVAL) 6 7 8   Manuals 23   STM/MFR  IASTM L glute medius, TFL, ITB       Manual flexibility          Joint mobs   B/L LAD gr III       Neuro Re-Ed         TA activation         Glute sets 2x10 (5s)  2x10 (5s)  2x10 (5s)       Hip add iso 2x10 (5s)  2x10 (5s)  2x10 (5s) 2x10 (5s)      Clamshells Supine 20x GTB Supine 20x GTB  Supine 20x GTB     Standing march         Standing hip abd   10x B/L *pain w/ RLE hip abd 10x B/L 10x B/L  10x B/L    Standing hip ext    10x B/L   10x B/L    Lateral walks     6x15 ft 6x15 ft    Ther Ex         Nustep 8' L1 6' L1 8' L1 8' L1  8' L1 8' L1   Seated forward trunk flexion 2x10 (5s)  2x10 (5s)  2x10 (5s)  2x10 (5s)  2x10 (5s)  2x10 (5s)    Seated sciatic nerve glide 2x10 (5s) B/L  2x10 (5s) B/L  2x10 (5s) B/L 2x10 B/L  2x10 B/L  2x10 B/L    Heel raises   2x10 standing      Sit-to-stand    2x10 from 23" plinth 2x10 from 22" plinth 2x10 from chair                                       Ther Activity         Pt education  Reviewed POC Updated POC Reviewed POC     Stairs         Squats         Gait                                    Modalities         MHP lumbar 10' seated pre-tx  10' seated after Nustep 5' seated after Nustep 5' seated, 5' while performing trunk flexion and sciatic nerve glide 5' seated, 5' while performing trunk flexion 5' seated, 5' while performing trunk flexion and sciatic nerve glide              Access Code: [email or web address]  URL: [email or web address]  Date: 10/24/2023  Prepared by: Wilbert Mata    Exercises  - Seated Flexion Stretch  - 3 x daily - 7 x weekly - 1 sets - 10 reps - 5 hold

## 2023-12-12 ENCOUNTER — OFFICE VISIT (OUTPATIENT)
Dept: PHYSICAL THERAPY | Facility: CLINIC | Age: 78
End: 2023-12-12
Payer: MEDICARE

## 2023-12-12 DIAGNOSIS — Z98.890 HISTORY OF LUMBAR SURGERY: ICD-10-CM

## 2023-12-12 DIAGNOSIS — M48.062 SPINAL STENOSIS OF LUMBAR REGION WITH NEUROGENIC CLAUDICATION: Primary | ICD-10-CM

## 2023-12-12 DIAGNOSIS — M51.16 LUMBAR DISC DISEASE WITH RADICULOPATHY: ICD-10-CM

## 2023-12-12 PROCEDURE — 97110 THERAPEUTIC EXERCISES: CPT

## 2023-12-12 PROCEDURE — 97112 NEUROMUSCULAR REEDUCATION: CPT

## 2023-12-12 NOTE — PROGRESS NOTES
Daily Note      Today's date: 2023  Patient name: Donna Mcmillan  : 1945  MRN: [id number]  Referring provider: Dora Hauser DO  Dx:   Encounter Diagnosis     ICD-10-CM    1. Spinal stenosis of lumbar region with neurogenic claudication  M48.062       2. History of lumbar surgery  Z98.890       3. Lumbar disc disease with radiculopathy  M51.16           Subjective: Pt reports increased left low back pain today. Pt attributes this potentially to standing hip abduction exercise last visit. Pt notes, however, that this began this morning and states she did not have this pain over the weekend. Pt reports no recent change in activity over the past few days. Pt presents in the waiting area sitting in a low wooden chair. Objective: See treatment diary below    Assessment: Pt tolerated treatment well. Pt required min to mod assist in order to stand from low seat in the waiting area. Pt noted increased left low back pain and radicular symptoms to the left calf during left single leg stance during marching and standing hip extension. Pt noted minimal relief with lumbar extension. Plan: Continue per plan of care. Progress treatment as tolerated. Insurance:  AMA/CMS Eval/ Re-eval POC expires FOTO Auth Status Co-Insurance   CMS - [facility] 10/24/23 12/31/23  None req No    23                                       1.  10/24 2.   10/31 3.   11/7 4.   11/14 5.    RE 6.      7.   12/5 8.   12/7 9.   12 10.   11.  12.    13.  14.  15.  16.  17.  18.    19.  20. 21. 22. 23. 24.    25.  26. 27. 28. 29. 30.   31. 32.  33. 34. 35. 36.        Precautions: Hx of lumbar (fusion?) surgery ~, hx of L TKA, neuropathy in both feet, FALL RISK       5 (RE-EVAL) 6 7 8 9    Manuals 23    STM/MFR         Manual flexibility          Joint mobs          Neuro Re-Ed         TA activation         Glute sets 2x10 (5s)         Hip add iso 2x10 (5s) 2x10 (5s)        Clamshells  Supine 20x GTB       Standing march     10x B/L    Standing hip abd 10x B/L *pain w/ RLE hip abd 10x B/L 10x B/L  10x B/L  Hold     Standing hip ext  10x B/L   10x B/L  10x B/L     Lateral walks   6x15 ft 6x15 ft      Ther Ex         Nustep 8' L1 8' L1  8' L1 8' L1 8' L1    Seated forward trunk flexion 2x10 (5s)  2x10 (5s)  2x10 (5s)  2x10 (5s)  2x10 (5s)     Seated sciatic nerve glide 2x10 (5s) B/L 2x10 B/L  2x10 B/L  2x10 B/L  2x10 B/L     Heel raises 2x10 standing        Sit-to-stand  2x10 from 23" plinth 2x10 from 22" plinth 2x10 from chair 10x from 21" plinth                                        Ther Activity         Pt education Updated POC Reviewed POC       Stairs         Squats         Gait                                    Modalities         MHP lumbar 5' seated after Nustep 5' seated, 5' while performing trunk flexion and sciatic nerve glide 5' seated, 5' while performing trunk flexion 5' seated, 5' while performing trunk flexion and sciatic nerve glide 5' seated, 5' while performing trunk flexion               Access Code: [email or web address]  URL: [email or web address]  Date: 10/24/2023  Prepared by: Jessica Treviño    Exercises  - Seated Flexion Stretch  - 3 x daily - 7 x weekly - 1 sets - 10 reps - 5 hold

## 2023-12-14 ENCOUNTER — OFFICE VISIT (OUTPATIENT)
Dept: PHYSICAL THERAPY | Facility: CLINIC | Age: 78
End: 2023-12-14
Payer: MEDICARE

## 2023-12-14 DIAGNOSIS — M48.062 SPINAL STENOSIS OF LUMBAR REGION WITH NEUROGENIC CLAUDICATION: Primary | ICD-10-CM

## 2023-12-14 DIAGNOSIS — M51.16 LUMBAR DISC DISEASE WITH RADICULOPATHY: ICD-10-CM

## 2023-12-14 DIAGNOSIS — Z98.890 HISTORY OF LUMBAR SURGERY: ICD-10-CM

## 2023-12-14 PROCEDURE — 97110 THERAPEUTIC EXERCISES: CPT

## 2023-12-14 PROCEDURE — 97112 NEUROMUSCULAR REEDUCATION: CPT

## 2023-12-14 NOTE — PROGRESS NOTES
Daily Note      Today's date: 2023  Patient name: Eduardo Rios  : 1945  MRN: [id number]  Referring provider: Jerral Sicard, DO  Dx:   Encounter Diagnosis     ICD-10-CM    1. Spinal stenosis of lumbar region with neurogenic claudication  M48.062       2. History of lumbar surgery  Z98.890       3. Lumbar disc disease with radiculopathy  M51.16           Subjective: Pt reports same as usual pain today, however had more pain yesterday. Pt states that she did not do much yesterday due to increased pain. Pt states that maybe the pain was due to the exercises. At the end of the session, pt states "I feel like I've made no progress."        Objective: See treatment diary below    Assessment: Pt tolerated treatment fair. Pt performed standing marches and noted fatigue with this exercise. Pt deferred standing hip extension due to increased pain with L SLS during this exercise. Pt attributed previous pain to standing hip abduction for the same reason. Pt continues to display preference for passive modes of therapy and limited tolerance for active modes. Discussed with pt that she has demonstrated limited progress since most recent re-assessment. Pt may potentially benefit from chronic pain group as conventional hospital-based outpatient PT has yielded minimal results. Plan: Continue per plan of care. Consider discussion of chronic pain group. Insurance:  A/CMS Eval/ Re-eval POC expires FOTO Auth Status Co-Insurance   CMS - [facility] 10/24/23 12/31/23  None req No    23                                       1.  10/24 2.   10/31 3.   117 4.    5.    RE 6.      7.   12/ 8.   12/7 9.   1212 10.    11.   12.    13.  14.  15.  16.  17.  18.    19.  20. 21. 22. 23. 24.    25.  26. 27. 28. 29. 30.   31. 32.  33. 34. 35. 36.        Precautions: Hx of lumbar (fusion?) surgery ~2020, hx of L TKA, neuropathy in both feet, FALL RISK       5 (RE-EVAL) 6 7 8 9 10   Manuals 11/21/23 11/28/23 12/5/23 12/7/23 12/12/23 12/14/23   STM/MFR         Manual flexibility          Joint mobs          Neuro Re-Ed         TA activation         Glute sets 2x10 (5s)         Hip add iso 2x10 (5s) 2x10 (5s)        Clamshells  Supine 20x GTB       Standing march     10x B/L 10x B/L    Standing hip abd 10x B/L *pain w/ RLE hip abd 10x B/L 10x B/L  10x B/L  Hold     Standing hip ext  10x B/L   10x B/L  10x B/L  deferred    Lateral walks   6x15 ft 6x15 ft      Ther Ex         Nustep 8' L1 8' L1  8' L1 8' L1 8' L1 10' L1   Seated forward trunk flexion 2x10 (5s)  2x10 (5s)  2x10 (5s)  2x10 (5s)  2x10 (5s)  2x10 (5s)    Seated sciatic nerve glide 2x10 (5s) B/L 2x10 B/L  2x10 B/L  2x10 B/L  2x10 B/L  2x10 B/L    Heel raises 2x10 standing        Sit-to-stand  2x10 from 23" plinth 2x10 from 22" plinth 2x10 from chair 10x from 21" plinth 2x10 from 21" plinth                                       Ther Activity         Pt education Updated POC Reviewed POC       Stairs         Squats         Gait                                    Modalities         MHP lumbar 5' seated after Nustep 5' seated, 5' while performing trunk flexion and sciatic nerve glide 5' seated, 5' while performing trunk flexion 5' seated, 5' while performing trunk flexion and sciatic nerve glide 5' seated, 5' while performing trunk flexion 5' seated, 5' while performing trunk flexion              Access Code: [email or web address]  URL: [email or web address]  Date: 10/24/2023  Prepared by: Abby Connell    Exercises  - Seated Flexion Stretch  - 3 x daily - 7 x weekly - 1 sets - 10 reps - 5 hold

## 2023-12-28 ENCOUNTER — APPOINTMENT (OUTPATIENT)
Dept: PHYSICAL THERAPY | Facility: CLINIC | Age: 78
End: 2023-12-28
Payer: MEDICARE

## 2024-01-03 ENCOUNTER — EVALUATION (OUTPATIENT)
Dept: PHYSICAL THERAPY | Facility: CLINIC | Age: 79
End: 2024-01-03
Payer: MEDICARE

## 2024-01-03 DIAGNOSIS — Z98.890 HISTORY OF LUMBAR SURGERY: ICD-10-CM

## 2024-01-03 DIAGNOSIS — M48.062 SPINAL STENOSIS OF LUMBAR REGION WITH NEUROGENIC CLAUDICATION: Primary | ICD-10-CM

## 2024-01-03 DIAGNOSIS — M51.16 LUMBAR DISC DISEASE WITH RADICULOPATHY: ICD-10-CM

## 2024-01-03 PROCEDURE — 97112 NEUROMUSCULAR REEDUCATION: CPT

## 2024-01-03 PROCEDURE — 97110 THERAPEUTIC EXERCISES: CPT

## 2024-01-03 NOTE — PROGRESS NOTES
"Daily Note      Today's date: 1/3/2024  Patient name: Maria Elena Sandoval  : 1945  MRN: [id number]  Referring provider: Hector Mora DO  Dx:   Encounter Diagnosis     ICD-10-CM    1. Spinal stenosis of lumbar region with neurogenic claudication  M48.062       2. History of lumbar surgery  Z98.890       3. Lumbar disc disease with radiculopathy  M51.16           Subjective: Pt reports that over the past few days, she has been \"in agony\" due to her sciatic nerve. Pt is accompanied by her granddaughter, Liliane. Pt states that she was also feeling sick near the holidays but is feeling better now, outside of having a cough. Pt states she has not been able to be active during the time she was sick.        Objective: See treatment diary below    Assessment: Pt tolerated treatment well.  Pt noted increased tension in posterior left leg with sciatic nerve glides compared to previous few visits. Pt demonstrated improved tolerance for standing march and hip abduction this visit. Pt notes antalgic gait with ambulation with unilateral railing with decreased stance time on LLE.     Plan: Continue per plan of care.  Progress treatment as tolerated.          Insurance:  AMA/CMS Eval/ Re-eval POC expires FOTO Auth Status Co-Insurance   CMS - [facility] 10/24/23 12/31/23  None req No    23                                       1.  10/24 2.   10/31 3.   11/7 4.   11 5.    RE 6.      7.   12/5 8.   12/7 9.   12 10.    11.   1/3  12.    13.  14.  15.  16.  17.  18.    19.  20. 21. 22. 23. 24.    25.  26. 27. 28. 29. 30.   31. 32.  33. 34. 35. 36.        Precautions: Hx of lumbar (fusion?) surgery ~, hx of L TKA, neuropathy in both feet, FALL RISK       7 8 9 10 11    Manuals 12/5/23 12/7/23 12/12/23 12/14/23 1/3/24    STM/MFR         Manual flexibility          Joint mobs          Neuro Re-Ed         TA activation         Glute sets         Hip add iso         Clamshells         Standing march   " "10x B/L 10x B/L  15x B/L w/ RW    Standing hip abd 10x B/L  10x B/L  Hold   10x B/L at plinth    Standing hip ext  10x B/L  10x B/L  deferred      Lateral walks 6x15 ft 6x15 ft    6x15 ft     Ambulation w/ postural cues     W/ unilateral bar 6x15 ft w/ VC for upright trunk    Ther Ex         Nustep 8' L1 8' L1 8' L1 10' L1 8' L1    Seated forward trunk flexion 2x10 (5s)  2x10 (5s)  2x10 (5s)  2x10 (5s)  2x10 (5s)     Seated sciatic nerve glide 2x10 B/L  2x10 B/L  2x10 B/L  2x10 B/L  2x10 B/L     Heel raises     2x10    Sit-to-stand 2x10 from 22\" plinth 2x10 from chair 10x from 21\" plinth 2x10 from 21\" plinth 2x10 from 21\" plinth    Leg press     2x10 55#                               Ther Activity         Pt education         Stairs         Squats         Gait                                    Modalities         MHP lumbar 5' seated, 5' while performing trunk flexion 5' seated, 5' while performing trunk flexion and sciatic nerve glide 5' seated, 5' while performing trunk flexion 5' seated, 5' while performing trunk flexion                Access Code: [email or web address]  URL: [email or web address]  Date: 10/24/2023  Prepared by: Feroz Stallworth    Exercises  - Seated Flexion Stretch  - 3 x daily - 7 x weekly - 1 sets - 10 reps - 5 hold                            "

## 2024-01-10 ENCOUNTER — OFFICE VISIT (OUTPATIENT)
Dept: PHYSICAL THERAPY | Facility: CLINIC | Age: 79
End: 2024-01-10
Payer: MEDICARE

## 2024-01-10 DIAGNOSIS — M48.062 SPINAL STENOSIS OF LUMBAR REGION WITH NEUROGENIC CLAUDICATION: Primary | ICD-10-CM

## 2024-01-10 DIAGNOSIS — M51.16 LUMBAR DISC DISEASE WITH RADICULOPATHY: ICD-10-CM

## 2024-01-10 DIAGNOSIS — Z98.890 HISTORY OF LUMBAR SURGERY: ICD-10-CM

## 2024-01-10 PROCEDURE — 97112 NEUROMUSCULAR REEDUCATION: CPT

## 2024-01-10 PROCEDURE — 97110 THERAPEUTIC EXERCISES: CPT

## 2024-01-10 NOTE — PROGRESS NOTES
Progress Note      Today's date: 1/10/2024  Patient name: Maria Elena Sandoval  : 1945  MRN: [id number]  Referring provider: Hector Mora DO  Dx:   Encounter Diagnosis     ICD-10-CM    1. Spinal stenosis of lumbar region with neurogenic claudication  M48.062       2. History of lumbar surgery  Z98.890       3. Lumbar disc disease with radiculopathy  M51.16           Subjective: Pt reports that she was feeling sick and missed last visit due to fatigue and persistent cough. Pt states her cough has persisted since around the holidays. Pt asks if a stationary cycle (portable with pedals) would be helpful for her.     Objective: See treatment diary below    Assessment:  Maria Elena Sandoval has been seen in hospital-based outpatient PT for 12 visits with chronic low back pain with referred symptoms into either leg, left > right. Patient demonstrates minimal progress toward short-term and long-term physical therapy goals. Patient continues to present with the following impairments: low back pain, referred symptoms into left thigh/calf, limited LE strength, limited core strength, and posture/gait dysfunction. Due to these impairments, patient continues to have difficulty performing the following: ADL's, recreational activities, engaging in social activities, ambulation, stair negotiation, lifting/carrying, self-care activities, prolonged standing, bending forward, putting on/taking off shoes/socks, household chores, and stair negotiation. Patient has been educated in updated plan of care and discharge planning. Patient was also educated that she may benefit from a stationary cycle and was advised to seek one that allows a trial period to see if it works well for her at home. Patient would benefit from continued skilled physical therapy services to establish and assess comprehensive HEP prior to discharge due to plateau in progress.     Neurological Testing  Light Touch Sensation - intact throughout BLE    Lumbar AROM      Flexion  Fingertip reach to malleoli   Extension 30% with low back stiffness   L side glide 50%   R side glide 50% w/ inc L posterior thigh pain   L rotation 60%    R rotation  60%      Repeated motions Position Repetitions performed Symptomatic response during Symptomatic response after   Flexion Standing 5 No effect No effect   Extension Standing 5 Increased LBP No worse       Lower Extremity Strength Testing    Hip MMT  Left  Right   Flexion  4-/5 4+/5    Abduction 4/5 4/5   Adduction 4/5 4/5     Knee MMT  Left  Right   Flexion  5/5 5/5    Extension 5/5 4/5     Ankle MMT  Left  Right   Dorsiflexion  4-/5 4-/5    Plantarflexion 5/5 5/5      Ambulation  Ambulates with forward flexed posture and significant thoracic kyphosis while gazing downward and with shortened step length      Plan: Continue per plan of care. Progress toward discharge next visit.     Plan details: The focus of the updated plan of care will be on strengthening, functional activities, and improving gait mechanics and ambulation endurance.   Patient would benefit from: skilled physical therapy  Planned modality interventions: cryotherapy, electrical stimulation/Russian stimulation, TENS, ultrasound, thermotherapy: hydrocollator packs, traction, high voltage pulsed current: spasm management and high voltage pulsed current: pain management  Planned therapy interventions: flexibility, functional ROM exercises, graded exercise, home exercise program, joint mobilization, manual therapy, neuromuscular re-education, patient education, strengthening, stretching, therapeutic exercise, therapeutic activities, balance/weight bearing training, gait training, abdominal trunk stabilization, self care, postural training, activity modification, ADL retraining, ADL training, balance, behavior modification, body mechanics training, breathing training, therapeutic training, transfer training, graded activity, graded motor, muscle pump exercises, Contreras taping  "and IADL retraining  Frequency: 2x week  Duration in weeks: 2  Plan of Care beginning date: 1/10/2024  Plan of Care expiration date: 1/31/2024        Insurance:  AMA/CMS Eval/ Re-eval POC expires FOTO Auth Status Co-Insurance   CMS - [facility] 10/24/23 12/31/23  None req No    11/21/23 1/21/24       1/10/24 1/31/24                               1.  10/24 2.   10/31 3.   11/7 4.   11/14 5.   11/21 RE 6.   11/28   7.   12/5 8.   12/7 9.   12/12 10.   12/14 11.   1/3  12.   1/10 RE   13.  14.  15.  16.  17.  18.    19.  20. 21. 22. 23. 24.    25.  26. 27. 28. 29. 30.   31. 32.  33. 34. 35. 36.        Precautions: Hx of lumbar (fusion?) surgery ~2020, hx of L TKA, neuropathy in both feet, FALL RISK       7 8 9 10 11 12 (RE-EVAL)   Manuals 12/5/23 12/7/23 12/12/23 12/14/23 1/3/24 1/10/24   STM/MFR         Manual flexibility          Joint mobs          Neuro Re-Ed         TA activation         Glute sets         Hip add iso         Clamshells         Standing march   10x B/L 10x B/L  15x B/L w/ RW 10x B/L w/ RW   Standing hip abd 10x B/L  10x B/L  Hold   10x B/L at plinth 10x B/L at plinth   Standing hip ext  10x B/L  10x B/L  deferred   10x B/L at plinth   Lateral walks 6x15 ft 6x15 ft    6x15 ft     Ambulation w/ postural cues     W/ unilateral bar 6x15 ft w/ VC for upright trunk    Ther Ex         Nustep 8' L1 8' L1 8' L1 10' L1 8' L1 10' L1    Seated forward trunk flexion 2x10 (5s)  2x10 (5s)  2x10 (5s)  2x10 (5s)  2x10 (5s)  2x10 (5s)    Seated sciatic nerve glide 2x10 B/L  2x10 B/L  2x10 B/L  2x10 B/L  2x10 B/L  2x10 B/L   Heel raises     2x10    Sit-to-stand 2x10 from 22\" plinth 2x10 from chair 10x from 21\" plinth 2x10 from 21\" plinth 2x10 from 21\" plinth 2x10 from 21\" plinth   Leg press     2x10 55# 2x10 55#                              Ther Activity         Pt education      D/C planning   Stairs         Squats         Gait                                    Modalities         MHP lumbar 5' seated, 5' while performing " trunk flexion 5' seated, 5' while performing trunk flexion and sciatic nerve glide 5' seated, 5' while performing trunk flexion 5' seated, 5' while performing trunk flexion  5' seated, 5' while performing trunk flex and sciatic nerve glide              Access Code: [email or web address]  URL: [email or web address]  Date: 01/10/2024  Prepared by: Feroz Stallworth    Exercises  - Seated Flexion Stretch  - 1 x daily - 7 x weekly - 2 sets - 10 reps - 5 hold  - Seated Knee Extension  - 1 x daily - 7 x weekly - 2 sets - 10 reps - 5 hold  - Sit to Stand with Counter Support  - 1 x daily - 3-4 x weekly - 2 sets - 10 reps  - Standing March with Counter Support  - 1 x daily - 3-4 x weekly - 2 sets - 10 reps  - Standing Hip Abduction with Counter Support  - 1 x daily - 3-4 x weekly - 2 sets - 10 reps  - Standing Hip Extension with Counter Support  - 1 x daily - 3-4 x weekly - 2 sets - 10 reps

## 2024-02-12 ENCOUNTER — TELEPHONE (OUTPATIENT)
Dept: PAIN MEDICINE | Facility: CLINIC | Age: 79
End: 2024-02-12

## 2024-02-13 NOTE — TELEPHONE ENCOUNTER
Caller: Maria Elena    Doctor: Abby    Reason for call: Pt is waiting for the Dr to call her back for her Virtual visit.    Call back#: [phone]

## 2024-02-13 NOTE — TELEPHONE ENCOUNTER
Pt could not figure out how to access her MyChart  Spoke with Suzanne Sheikh at 2:08 pm and requested to reschedule pt for in office follow up next Tuesday 2/20/24  She said this would be taken care of  Sorry for the confusion   Please ensure patient is rescheduled for in office follow up with me Tuesday 2/20/24  Thank you

## 2024-02-14 NOTE — TELEPHONE ENCOUNTER
Schedule pt for appt on 2/20/24 at 1:30, however unable to reach pt, message says # is no longer in service. LM for Daughter, Judith to call me back to confirm phone #.

## 2024-02-20 ENCOUNTER — OFFICE VISIT (OUTPATIENT)
Dept: PAIN MEDICINE | Facility: CLINIC | Age: 79
End: 2024-02-20
Payer: MEDICARE

## 2024-02-20 ENCOUNTER — TELEPHONE (OUTPATIENT)
Dept: PAIN MEDICINE | Facility: CLINIC | Age: 79
End: 2024-02-20

## 2024-02-20 VITALS
BODY MASS INDEX: 34.93 KG/M2 | WEIGHT: 185 LBS | TEMPERATURE: 97.9 F | HEART RATE: 87 BPM | HEIGHT: 61 IN | DIASTOLIC BLOOD PRESSURE: 75 MMHG | SYSTOLIC BLOOD PRESSURE: 149 MMHG

## 2024-02-20 DIAGNOSIS — M54.9 BACK PAIN WITH HISTORY OF SPINAL SURGERY: ICD-10-CM

## 2024-02-20 DIAGNOSIS — M46.1 SACROILIITIS (HCC): ICD-10-CM

## 2024-02-20 DIAGNOSIS — Z98.890 BACK PAIN WITH HISTORY OF SPINAL SURGERY: ICD-10-CM

## 2024-02-20 DIAGNOSIS — G89.29 CHRONIC BILATERAL LOW BACK PAIN WITH BILATERAL SCIATICA: Primary | ICD-10-CM

## 2024-02-20 DIAGNOSIS — M96.1 POST LAMINECTOMY SYNDROME: ICD-10-CM

## 2024-02-20 DIAGNOSIS — G89.4 CHRONIC PAIN SYNDROME: ICD-10-CM

## 2024-02-20 DIAGNOSIS — M54.41 CHRONIC BILATERAL LOW BACK PAIN WITH BILATERAL SCIATICA: Primary | ICD-10-CM

## 2024-02-20 DIAGNOSIS — M48.061 LUMBAR FORAMINAL STENOSIS: ICD-10-CM

## 2024-02-20 DIAGNOSIS — M54.42 CHRONIC BILATERAL LOW BACK PAIN WITH BILATERAL SCIATICA: Primary | ICD-10-CM

## 2024-02-20 PROCEDURE — 99214 OFFICE O/P EST MOD 30 MIN: CPT | Performed by: PHYSICAL MEDICINE & REHABILITATION

## 2024-02-20 NOTE — PROGRESS NOTES
Pain Medicine Follow-Up Note    Assessment:  1. Chronic bilateral low back pain with bilateral sciatica    2. Post laminectomy syndrome    3. Back pain with history of spinal surgery    4. Chronic pain syndrome    5. Lumbar foraminal stenosis    6. Sacroiliitis (HCC)        Plan:  Ms. Sandoval is a pleasant 78-year-old female significant past medical history of L3-L4 and L4-L5 laminectomy presents to [facility] unrelieved with conservative, interventional and medication management approaches regarding her ongoing lumbar radiculopathy into bilateral lower extremities.  We have attempted L5-S1 LESI and have trialed membrane stabilizing agents including Cymbalta with minimal improvements in her pain.  At this time    I do believe the patient may be a good candidate for a spinal cord stimulator for chronic pain. I reviewed the nature of the neurostimulation in detail, discussed the role of the trial as well as the permanent implantation. The technology as well as the approach was demonstrated using models and additional literature was provided.  Plan:  #1 we will obtain the requisite psychological authorization/clearance to rule out any significant psychological comorbidity that would prevent the patient from benefiting  #2 WE WILL OBTAIN MRI OF THE THORACIC SPINE TO RULE OUT ANY SIGNIFICANT STENOSIS THAT WOULD PREVENT LEAD PASSAGE AND TO RULE OUT PATHOLOGY THAT WOULE MAKE LEAD PASSAGE IN THE EPIDURAL SPACE UNSAFE  #3 we will make arrangements for the patient to participate in an patient education session as well.  Complete risks and benefits including bleeding, infection, tissue reaction, allergic reaction, nerve injury, paralysis, CSF leak/spinal headache were reviewed.    History of Present Illness:    Maria Elena Sandoval is a 78 y.o. female who presents to [facility] for interval re-evaluation of the above stated pain complaints. The patient has a past medical and chronic pain  history as outlined in the assessment section.  Patient presents for follow-up and reevaluation regarding ongoing low back pain with radiating symptoms into the left lower extremity currently rated 8 out of 10 and described as a constant burning, sharp, cramping, numbness, pins and needle sensation that is worse in the morning in the evening.  Presents today for follow-up and reevaluation.    Other than as stated above, the patient denies any interval changes in medications, medical condition, mental condition, symptoms, or allergies since the last office visit.         Review of Systems:    Review of Systems   Constitutional:  Negative for unexpected weight change.   HENT:  Negative for ear pain.    Eyes:  Negative for visual disturbance.   Respiratory:  Negative for shortness of breath and wheezing.    Gastrointestinal:  Negative for abdominal pain.   Musculoskeletal:  Positive for back pain and gait problem.        Decreased ROM,  muscle and joint pain in the lower back that travels down the leg   Neurological:  Positive for weakness and numbness.   Psychiatric/Behavioral:  Negative for decreased concentration.          Past Medical History:   Diagnosis Date    Arthritis     Fibromyalgia, primary     Sciatica        Past Surgical History:   Procedure Laterality Date    COLON SURGERY      colostomy    EPIDURAL BLOCK INJECTION N/A 08/25/2023    Procedure: L5-S1 LUMBAR epidural steroid injection (16064);  Surgeon: Hector Mora DO;  Location: [facility] MAIN OR;  Service: Pain Management     CA INJECT SI JOINT ARTHRGRPHY&/ANES/STEROID W/PRIMO Bilateral 06/09/2023    Procedure: SACROILIAC joint injection (33703 );  Surgeon: Endy Christopher MD;  Location: [facility] MAIN OR;  Service: Pain Management     SPINAL FUSION      SPINE SURGERY         Family History   Problem Relation Age of Onset    No Known Problems Mother     No Known Problems Father        Social History     Occupational History    Not on file   Tobacco  "Use    Smoking status: Former     Types: Cigarettes    Smokeless tobacco: Never   Substance and Sexual Activity    Alcohol use: Never    Drug use: Never    Sexual activity: Never         Current Outpatient Medications:     candesartan (ATACAND) 32 MG tablet, Take 32 mg by mouth daily, Disp: , Rfl:     carvedilol (COREG) 12.5 mg tablet, Take 12.5 mg by mouth 2 (two) times a day with meals, Disp: , Rfl:     irbesartan (AVAPRO) 150 mg tablet, , Disp: , Rfl:     Multiple Vitamin (multivitamin) tablet, Take 1 tablet by mouth daily, Disp: , Rfl:     prednisoLONE 5 MG (21) TBPK, , Disp: , Rfl:     zolpidem (AMBIEN) 5 mg tablet, , Disp: , Rfl:     bacitracin topical ointment 500 units/g topical ointment, Apply 1 large application topically 2 (two) times a day, Disp: 28 g, Rfl: 0    calcium carbonate (OS-HUE) 600 MG tablet, Take 600 mg by mouth daily (Patient not taking: Reported on 11/7/2023), Disp: , Rfl:     DULoxetine (CYMBALTA) 60 mg delayed release capsule, Take 1 capsule (60 mg total) by mouth daily, Disp: 30 capsule, Rfl: 2    meloxicam (MOBIC) 7.5 mg tablet, , Disp: , Rfl:     Myrbetriq 50 MG TB24, 25 mg, Disp: , Rfl:     pantoprazole (PROTONIX) 40 mg tablet, , Disp: , Rfl:     pregabalin (LYRICA) 25 mg capsule, Take 1 capsule (25 mg total) by mouth 2 (two) times a day, Disp: 60 capsule, Rfl: 1    traMADol (ULTRAM) 50 mg tablet, , Disp: , Rfl:     No Known Allergies    Physical Exam:    /75   Pulse 87   Temp 97.9 °F (36.6 °C)   Ht 5' 1\" (1.549 m)   Wt 83.9 kg (185 lb)   BMI 34.96 kg/m²     Constitutional:normal, well developed, well nourished, alert, in no distress and non-toxic and no overt pain behavior.  Eyes:anicteric  HEENT:grossly intact  Neck:supple, symmetric, trachea midline and no masses   Pulmonary:even and unlabored  Cardiovascular:No edema or pitting edema present  Skin:Normal without rashes or lesions and well hydrated  Psychiatric:Mood and affect appropriate  Neurologic:Cranial Nerves " II-XII grossly intact  Musculoskeletal:antalgic and ambulates with rollator      Imaging  MRI thoracic spine without contrast    (Results Pending)         Orders Placed This Encounter   Procedures    MRI thoracic spine without contrast    Ambulatory referral to Psych Services

## 2024-02-20 NOTE — TELEPHONE ENCOUNTER
Pt to have [employer] SCS trial w Dr. Mora.    Once education completed, will set up for psych eval.    No anti coags on med list.

## 2024-02-22 NOTE — TELEPHONE ENCOUNTER
Received message from Lynette (Jenny's ), pt not able to travel to [place] or complete virtually so need to set up somewhere else. Called pt multiple times yesterday afternoon, and this morning- and phone is not in service. Sent pt [employer] message to please call me directly.

## 2024-03-07 ENCOUNTER — HOSPITAL ENCOUNTER (OUTPATIENT)
Dept: RADIOLOGY | Facility: HOSPITAL | Age: 79
Discharge: HOME | End: 2024-03-07
Attending: PHYSICAL MEDICINE & REHABILITATION
Payer: MEDICARE

## 2024-03-07 DIAGNOSIS — M48.061 LUMBAR FORAMINAL STENOSIS: ICD-10-CM

## 2024-03-07 DIAGNOSIS — Z98.890 BACK PAIN WITH HISTORY OF SPINAL SURGERY: ICD-10-CM

## 2024-03-07 DIAGNOSIS — M96.1 POST LAMINECTOMY SYNDROME: ICD-10-CM

## 2024-03-07 DIAGNOSIS — G89.4 CHRONIC PAIN SYNDROME: ICD-10-CM

## 2024-03-07 DIAGNOSIS — M54.41 CHRONIC BILATERAL LOW BACK PAIN WITH BILATERAL SCIATICA: ICD-10-CM

## 2024-03-07 DIAGNOSIS — G89.29 CHRONIC BILATERAL LOW BACK PAIN WITH BILATERAL SCIATICA: ICD-10-CM

## 2024-03-07 DIAGNOSIS — M54.42 CHRONIC BILATERAL LOW BACK PAIN WITH BILATERAL SCIATICA: ICD-10-CM

## 2024-03-07 DIAGNOSIS — M54.9 BACK PAIN WITH HISTORY OF SPINAL SURGERY: ICD-10-CM

## 2024-03-07 PROCEDURE — 72146 MRI CHEST SPINE W/O DYE: CPT

## 2024-03-13 ENCOUNTER — TELEPHONE (OUTPATIENT)
Dept: PAIN MEDICINE | Facility: CLINIC | Age: 79
End: 2024-03-13

## 2024-03-13 DIAGNOSIS — M54.41 CHRONIC BILATERAL LOW BACK PAIN WITH BILATERAL SCIATICA: ICD-10-CM

## 2024-03-13 DIAGNOSIS — S22.050A COMPRESSION FRACTURE OF T5 VERTEBRA, INITIAL ENCOUNTER (HCC): ICD-10-CM

## 2024-03-13 DIAGNOSIS — G89.29 CHRONIC BILATERAL LOW BACK PAIN WITH BILATERAL SCIATICA: ICD-10-CM

## 2024-03-13 DIAGNOSIS — M54.42 CHRONIC BILATERAL LOW BACK PAIN WITH BILATERAL SCIATICA: ICD-10-CM

## 2024-03-13 DIAGNOSIS — G89.4 CHRONIC PAIN SYNDROME: Primary | ICD-10-CM

## 2024-03-13 DIAGNOSIS — M51.24 THORACIC DISC HERNIATION: ICD-10-CM

## 2024-03-13 DIAGNOSIS — M46.1 SACROILIITIS (HCC): ICD-10-CM

## 2024-03-13 NOTE — TELEPHONE ENCOUNTER
----- Message from Hector Mora DO sent at 3/13/2024  3:02 PM EDT -----  Clinical please notify patient of MRI thoracic spine demonstrating scoliosis, multilevel disc herniations notable at T7-T8, T10-T11, T11-T12 with varying degrees of stenosis/narrowing as well as an incidental finding of multiple compression deformities most notable at T5 with approximately 50% height loss  While we are considering a a spinal cord stim prior to us proceeding with a trial I would like the patient to be seen by neurosurgery for their blessing and expertise given the notable findings on MRI thoracic spine prior to us proceeding  Please advise patient to be seen by neurosurgery and a referral has been placed  Thank you    ----- Message -----  From: Interface, Radiology Results In  Sent: 3/13/2024   1:37 PM EDT  To: Hector Mora DO

## 2024-03-15 NOTE — TELEPHONE ENCOUNTER
Caller: Maria Elena    Doctor: Abby    Reason for call: Pt is returning RN call     Call back#: [phone]

## 2024-03-15 NOTE — TELEPHONE ENCOUNTER
[employer] message sent    Good morning Maria Elena,    Our office has been trying to reach you to notify you of your MRI results. If you could please call [phone]  to review, we would appreciate it.    Thank you,  Rebecca HALEY

## 2024-03-15 NOTE — TELEPHONE ENCOUNTER
S/w pt and advised of the same, nurse provided neurosurgery # to schedule appt.  Nurse advised pt to reach back out after she has OVS with neurosurgery for SPA to review notes.  Pt verbalized understanding and appreciative of call.

## 2024-04-02 ENCOUNTER — CONSULT (OUTPATIENT)
Dept: NEUROSURGERY | Facility: CLINIC | Age: 79
End: 2024-04-02
Payer: MEDICARE

## 2024-04-02 VITALS
SYSTOLIC BLOOD PRESSURE: 148 MMHG | TEMPERATURE: 97.9 F | DIASTOLIC BLOOD PRESSURE: 78 MMHG | BODY MASS INDEX: 34.74 KG/M2 | HEIGHT: 61 IN | WEIGHT: 184 LBS | HEART RATE: 76 BPM | OXYGEN SATURATION: 90 %

## 2024-04-02 DIAGNOSIS — M54.42 CHRONIC BILATERAL LOW BACK PAIN WITH BILATERAL SCIATICA: ICD-10-CM

## 2024-04-02 DIAGNOSIS — M51.24 THORACIC DISC HERNIATION: ICD-10-CM

## 2024-04-02 DIAGNOSIS — G89.4 CHRONIC PAIN SYNDROME: ICD-10-CM

## 2024-04-02 DIAGNOSIS — M54.41 CHRONIC BILATERAL LOW BACK PAIN WITH BILATERAL SCIATICA: ICD-10-CM

## 2024-04-02 DIAGNOSIS — G89.29 CHRONIC BILATERAL LOW BACK PAIN WITH BILATERAL SCIATICA: ICD-10-CM

## 2024-04-02 DIAGNOSIS — S22.050A COMPRESSION FRACTURE OF T5 VERTEBRA, INITIAL ENCOUNTER (HCC): Primary | ICD-10-CM

## 2024-04-02 DIAGNOSIS — M46.1 SACROILIITIS (HCC): ICD-10-CM

## 2024-04-02 PROCEDURE — 99204 OFFICE O/P NEW MOD 45 MIN: CPT | Performed by: PHYSICIAN ASSISTANT

## 2024-04-02 RX ORDER — VENLAFAXINE HYDROCHLORIDE 75 MG/1
75 CAPSULE, EXTENDED RELEASE ORAL DAILY
COMMUNITY
Start: 2024-03-09

## 2024-04-02 RX ORDER — IBUPROFEN 200 MG
TABLET ORAL EVERY 6 HOURS PRN
COMMUNITY

## 2024-04-02 NOTE — ASSESSMENT & PLAN NOTE
Presents for consultation regarding her low back and L leg pain.   Referred by pain management for further evaluation regarding possible spinal cord stimulator versus surgical options.    Imaging:   MRI lumbar spine 7/10/2023: Postoperative changes and multilevel degenerative disc disease.  Multifactorial disease resulting in moderate mass effect on the thecal sac at L3-4.  Moderate to severe foraminal narrowing on the left at L4-5 and bilaterally at L5-S1.  MRI thoracic spine 3/7/2024: Scoliosis and exaggerated lumbar lordosis.  Multiple chronic compression deformities with the worst at T5.  No acute compression fracture.  Degenerative disc disease with disc herniations at T7-8 and T10-11 as well as T11-12 with mild canal stenosis.  No cord compression identified.    Plan:   Continue to monitor symptoms   Most consistent with L4-5 nerve root radiculopathy which is within her prior surgical levels  Reviewed imaging with patient in room  Given her scoliosis and prior surgery, surgical correction of radiculopathy would be a significant surgery requiring further hardware removal and placement with decompression  Would still anticipate complex surgical procedure with likelihood to still have pain post operatively  Patient not interesting in large surgical procedure as she did not have favorable outcome from her first surgery several years ago.  Follows with PM   Recommended trial for SCS. She has mile degenerative changes in the thoracic spine although these appear to be isolated to the ventral aspect  Can proceed with trial of SCS as patient interested in less invasive options for pain relief.   Discussion held with patient regarding realistic goals for her pain control.  It is unlikely for patient ever to be pain-free but diminished pain may result in improved quality of life which is primary goal.   Continue taking pain medication as prescribed  Continue to be active with walking and low impact activities as  tolerated  Should patient have improved pain control would recommend physical therapy for gait and stability  Follow up after trial of SCS should patient be willing to proceed with placement of permanent system. Encouraged to call with questions or concerns

## 2024-05-09 ENCOUNTER — TRANSCRIBE ORDERS (OUTPATIENT)
Dept: PAIN MEDICINE | Facility: CLINIC | Age: 79
End: 2024-05-09

## 2024-05-10 ENCOUNTER — PREP FOR PROCEDURE (OUTPATIENT)
Dept: PAIN MEDICINE | Facility: CLINIC | Age: 79
End: 2024-05-10

## 2024-05-10 DIAGNOSIS — G89.4 CHRONIC PAIN SYNDROME: Primary | ICD-10-CM

## 2024-05-10 DIAGNOSIS — M54.16 LUMBAR RADICULOPATHY: ICD-10-CM

## 2024-06-17 ENCOUNTER — TRANSCRIBE ORDERS (OUTPATIENT)
Dept: PAIN MEDICINE | Facility: CLINIC | Age: 79
End: 2024-06-17

## 2024-06-18 DIAGNOSIS — Z79.899 ENCOUNTER FOR LONG-TERM (CURRENT) USE OF OTHER MEDICATIONS: ICD-10-CM

## 2024-06-18 DIAGNOSIS — G89.4 CHRONIC PAIN SYNDROME: Primary | ICD-10-CM

## 2024-06-18 DIAGNOSIS — Z79.01 CHRONIC ANTICOAGULATION: ICD-10-CM

## 2024-06-19 ENCOUNTER — APPOINTMENT (OUTPATIENT)
Dept: LAB | Facility: HOSPITAL | Age: 79
End: 2024-06-19
Payer: MEDICARE

## 2024-06-19 ENCOUNTER — OFFICE VISIT (OUTPATIENT)
Dept: PAIN MEDICINE | Facility: CLINIC | Age: 79
End: 2024-06-19
Payer: MEDICARE

## 2024-06-19 VITALS
DIASTOLIC BLOOD PRESSURE: 82 MMHG | WEIGHT: 182 LBS | SYSTOLIC BLOOD PRESSURE: 145 MMHG | HEIGHT: 61 IN | HEART RATE: 91 BPM | BODY MASS INDEX: 34.36 KG/M2

## 2024-06-19 DIAGNOSIS — G89.29 CHRONIC BILATERAL LOW BACK PAIN WITH BILATERAL SCIATICA: ICD-10-CM

## 2024-06-19 DIAGNOSIS — Z79.899 ENCOUNTER FOR LONG-TERM (CURRENT) USE OF OTHER MEDICATIONS: ICD-10-CM

## 2024-06-19 DIAGNOSIS — G89.4 CHRONIC PAIN SYNDROME: Primary | ICD-10-CM

## 2024-06-19 DIAGNOSIS — M48.061 FORAMINAL STENOSIS OF LUMBAR REGION: ICD-10-CM

## 2024-06-19 DIAGNOSIS — Z79.01 CHRONIC ANTICOAGULATION: ICD-10-CM

## 2024-06-19 DIAGNOSIS — M54.42 CHRONIC BILATERAL LOW BACK PAIN WITH BILATERAL SCIATICA: ICD-10-CM

## 2024-06-19 DIAGNOSIS — M46.1 SACROILIITIS (HCC): ICD-10-CM

## 2024-06-19 DIAGNOSIS — Z98.890 HISTORY OF BACK SURGERY: ICD-10-CM

## 2024-06-19 DIAGNOSIS — M54.16 LUMBAR RADICULOPATHY: ICD-10-CM

## 2024-06-19 DIAGNOSIS — M54.41 CHRONIC BILATERAL LOW BACK PAIN WITH BILATERAL SCIATICA: ICD-10-CM

## 2024-06-19 DIAGNOSIS — G89.4 CHRONIC PAIN SYNDROME: ICD-10-CM

## 2024-06-19 DIAGNOSIS — M96.1 LUMBAR POST-LAMINECTOMY SYNDROME: ICD-10-CM

## 2024-06-19 LAB
ALBUMIN SERPL BCG-MCNC: 4.3 G/DL (ref 3.5–5)
ALP SERPL-CCNC: 45 U/L (ref 34–104)
ALT SERPL W P-5'-P-CCNC: 18 U/L (ref 7–52)
ANION GAP SERPL CALCULATED.3IONS-SCNC: 7 MMOL/L (ref 4–13)
APTT PPP: 31 SECONDS (ref 23–37)
AST SERPL W P-5'-P-CCNC: 18 U/L (ref 13–39)
BASOPHILS # BLD AUTO: 0.05 THOUSANDS/ÂΜL (ref 0–0.1)
BASOPHILS NFR BLD AUTO: 0 % (ref 0–1)
BILIRUB SERPL-MCNC: 0.51 MG/DL (ref 0.2–1)
BUN SERPL-MCNC: 28 MG/DL (ref 5–25)
CALCIUM SERPL-MCNC: 9.5 MG/DL (ref 8.4–10.2)
CHLORIDE SERPL-SCNC: 104 MMOL/L (ref 96–108)
CO2 SERPL-SCNC: 26 MMOL/L (ref 21–32)
CREAT SERPL-MCNC: 0.79 MG/DL (ref 0.6–1.3)
EOSINOPHIL # BLD AUTO: 0.01 THOUSAND/ÂΜL (ref 0–0.61)
EOSINOPHIL NFR BLD AUTO: 0 % (ref 0–6)
ERYTHROCYTE [DISTWIDTH] IN BLOOD BY AUTOMATED COUNT: 15.1 % (ref 11.6–15.1)
EST. AVERAGE GLUCOSE BLD GHB EST-MCNC: 137 MG/DL
GFR SERPL CREATININE-BSD FRML MDRD: 72 ML/MIN/1.73SQ M
GLUCOSE SERPL-MCNC: 106 MG/DL (ref 65–140)
HBA1C MFR BLD: 6.4 %
HCT VFR BLD AUTO: 40.7 % (ref 34.8–46.1)
HGB BLD-MCNC: 13 G/DL (ref 11.5–15.4)
IMM GRANULOCYTES # BLD AUTO: 0.16 THOUSAND/UL (ref 0–0.2)
IMM GRANULOCYTES NFR BLD AUTO: 1 % (ref 0–2)
INR PPP: 1.03 (ref 0.84–1.19)
LYMPHOCYTES # BLD AUTO: 0.98 THOUSANDS/ÂΜL (ref 0.6–4.47)
LYMPHOCYTES NFR BLD AUTO: 8 % (ref 14–44)
MCH RBC QN AUTO: 29.5 PG (ref 26.8–34.3)
MCHC RBC AUTO-ENTMCNC: 31.9 G/DL (ref 31.4–37.4)
MCV RBC AUTO: 93 FL (ref 82–98)
MONOCYTES # BLD AUTO: 1.54 THOUSAND/ÂΜL (ref 0.17–1.22)
MONOCYTES NFR BLD AUTO: 13 % (ref 4–12)
NEUTROPHILS # BLD AUTO: 9.29 THOUSANDS/ÂΜL (ref 1.85–7.62)
NEUTS SEG NFR BLD AUTO: 78 % (ref 43–75)
NRBC BLD AUTO-RTO: 0 /100 WBCS
PLATELET # BLD AUTO: 251 THOUSANDS/UL (ref 149–390)
PMV BLD AUTO: 10.3 FL (ref 8.9–12.7)
POTASSIUM SERPL-SCNC: 4.2 MMOL/L (ref 3.5–5.3)
PROT SERPL-MCNC: 7.9 G/DL (ref 6.4–8.4)
PROTHROMBIN TIME: 13.7 SECONDS (ref 11.6–14.5)
RBC # BLD AUTO: 4.4 MILLION/UL (ref 3.81–5.12)
SODIUM SERPL-SCNC: 137 MMOL/L (ref 135–147)
WBC # BLD AUTO: 12.03 THOUSAND/UL (ref 4.31–10.16)

## 2024-06-19 PROCEDURE — 99214 OFFICE O/P EST MOD 30 MIN: CPT

## 2024-06-19 PROCEDURE — 85025 COMPLETE CBC W/AUTO DIFF WBC: CPT

## 2024-06-19 PROCEDURE — 85730 THROMBOPLASTIN TIME PARTIAL: CPT

## 2024-06-19 PROCEDURE — 36415 COLL VENOUS BLD VENIPUNCTURE: CPT

## 2024-06-19 PROCEDURE — 83036 HEMOGLOBIN GLYCOSYLATED A1C: CPT

## 2024-06-19 PROCEDURE — 80053 COMPREHEN METABOLIC PANEL: CPT

## 2024-06-19 PROCEDURE — 85610 PROTHROMBIN TIME: CPT

## 2024-06-19 NOTE — PROGRESS NOTES
Assessment    1. Chronic pain syndrome        2. Lumbar radiculopathy        3. Lumbar post-laminectomy syndrome        4. Chronic bilateral low back pain with bilateral sciatica        5. History of back surgery        6. Sacroiliitis (HCC)        7. Foraminal stenosis of lumbar region            Plan  The spinal cord stimulator trial was discussed in depth with the patient. The patient was advised that a stimulator lead will be placed percutaneously through an epidural needle, with intra-op stimulation done to confirm appropriate lead placement.  The lead will then be connected to an external generator and secured to the skin.  The patient was advised that an industry clinical specialist will be present for the trial, program the stimulator and explain how to use it.  During the trial, the patient was instructed to perform their activities of daily living, but limit twisting and bending motions, and no lifting greater than 10 pounds.  The patient was advised to refrain from tub baths, showers, swimming, and hot tubs during the trial. The patient will return to the office for trial evaluation and lead removal on 7/2/2024. At that time, if the trial is successful, the patient will be referred to [facility]'s Neurosurgery for permanent spinal cord stimulator placement.     Pre-procedure instructions were reviewed with the patient. The patient was given a prescription for blood work to be done by 6/19/2024. Complete risks and benefits including bleeding, infection, headache, tissue reaction, nerve injury and allergic reaction were discussed. The approach was demonstrated using models and literature was provided. The patient was advised that they would receive pre-procedure antibiotics and a prescription to take during the week of the trial.    The patient was advised to hold ibuprofen starting on 6/25/2024 and for the duration of the trial    The patient will next follow- up on 6/26/2024 for the stimulator  trial.    History of Present Illness  The patient is a 79 y.o. female scheduled for an Medtronic spinal cord stimulator trial to treat chronic pain from postlaminectomy syndrome, foraminal stenosis.  The current pain pattern includes pain that is running across her back and shooting down her posterior leg to her foot.   The patient's goals for the trial include walk more, be able to walk without DME, and social gatherings .    Pain Assessment Measures   Numeric Rating Scale 8   Oswestry Disability Index Score/Neck Disability Index Score 50   PROMIS-29   Physical Function 9   Anxiety 4   Depression 4   Fatigue 10   Sleep Disturbance 6   Ability to Participate in Social Roles And Activities 10   Pain Interference 16         I have personally reviewed and/or updated the patient's past medical history, past surgical history, family history, social history, current medications, allergies, and vital signs today.     Review of Systems   Respiratory:  Negative for shortness of breath.    Cardiovascular:  Negative for chest pain.   Gastrointestinal:  Negative for constipation, diarrhea, nausea and vomiting.   Musculoskeletal:  Positive for arthralgias and back pain. Negative for gait problem, joint swelling and myalgias.   Skin:  Negative for rash.   Neurological:  Negative for dizziness, seizures and weakness.   All other systems reviewed and are negative.       Past Medical History:   Diagnosis Date    Arthritis     Fibromyalgia, primary     Sciatica        Past Surgical History:   Procedure Laterality Date    COLON SURGERY      colostomy    EPIDURAL BLOCK INJECTION N/A 08/25/2023    Procedure: L5-S1 LUMBAR epidural steroid injection (51550);  Surgeon: Hector Mora DO;  Location: [facility] MAIN OR;  Service: Pain Management     [place] INJECT SI JOINT ARTHRGRPHY&/ANES/STEROID W/PRIMO Bilateral 06/09/2023    Procedure: SACROILIAC joint injection (01051 );  Surgeon: Endy Christopher MD;  Location: [facility] MAIN OR;  Service:  "Pain Management     SPINAL FUSION      SPINE SURGERY         Family History   Problem Relation Age of Onset    No Known Problems Mother     No Known Problems Father        Social History     Occupational History    Not on file   Tobacco Use    Smoking status: Former     Types: Cigarettes    Smokeless tobacco: Never   Substance and Sexual Activity    Alcohol use: Never    Drug use: Never    Sexual activity: Never         Current Outpatient Medications:     calcium carbonate (OS-HUE) 600 MG tablet, Take 600 mg by mouth daily, Disp: , Rfl:     candesartan (ATACAND) 32 MG tablet, Take 32 mg by mouth daily, Disp: , Rfl:     carvedilol (COREG) 12.5 mg tablet, Take 12.5 mg by mouth 2 (two) times a day with meals, Disp: , Rfl:     ibuprofen (Advil) 200 mg tablet, Take by mouth every 6 (six) hours as needed for mild pain, Disp: , Rfl:     irbesartan (AVAPRO) 150 mg tablet, , Disp: , Rfl:     Multiple Vitamin (multivitamin) tablet, Take 1 tablet by mouth daily, Disp: , Rfl:     pantoprazole (PROTONIX) 40 mg tablet, , Disp: , Rfl:     prednisoLONE 5 MG (21) TBPK, , Disp: , Rfl:     venlafaxine (EFFEXOR-XR) 75 mg 24 hr capsule, Take 75 mg by mouth daily, Disp: , Rfl:     zolpidem (AMBIEN) 5 mg tablet, , Disp: , Rfl:     No Known Allergies    Physical Exam    /82   Pulse 91   Ht 5' 1\" (1.549 m)   Wt 82.6 kg (182 lb)   BMI 34.39 kg/m²     Constitutional:normal, well developed, well nourished, alert, in no distress and non-toxic and no overt pain behavior. and overweight  Eyes:anicteric  HEENT:grossly intact  Neck:supple, symmetric, trachea midline and no masses   Pulmonary:even and unlabored,CTA  Cardiovascular:No edema or pitting edema present, Regular rate, regular rhythm  GI: normal bowel sounds up to colostomy  Skin:Normal without rashes or lesions and well hydrated  Psychiatric:Mood and affect appropriate  Neurologic:Cranial Nerves II-XII grossly intact  Musculoskeletal:antalgic and ambulates with a walker    This " document was created using speech voice recognition software.   Grammatical errors, random word insertions, pronoun errors, and incomplete sentences are an occasional consequence of this system due to software limitations, ambient noise, and hardware issues.   Any formal questions or concerns about content, text, or information contained within the body of this dictation should be directly addressed to the provider for clarification.

## 2024-06-20 ENCOUNTER — TELEPHONE (OUTPATIENT)
Dept: PAIN MEDICINE | Facility: CLINIC | Age: 79
End: 2024-06-20

## 2024-06-20 DIAGNOSIS — D72.829 LEUKOCYTOSIS, UNSPECIFIED TYPE: Primary | ICD-10-CM

## 2024-06-20 NOTE — TELEPHONE ENCOUNTER
----- Message from Hector Mora DO sent at 6/20/2024  8:15 AM EDT -----  Please notify patient CBC with differential demonstrating white blood cell count 12.03 elevated  Repeat CBC has been ordered  If the white blood cell count remains elevated she will need to discuss with her PCP the unidentified cause of her leukocytosis  Thank you  ----- Message -----  From: Lab, Background User  Sent: 6/19/2024   3:49 PM EDT  To: Hector Mora DO

## 2024-06-21 ENCOUNTER — APPOINTMENT (OUTPATIENT)
Dept: LAB | Facility: HOSPITAL | Age: 79
End: 2024-06-21
Payer: MEDICARE

## 2024-06-21 DIAGNOSIS — D72.829 LEUKOCYTOSIS, UNSPECIFIED TYPE: ICD-10-CM

## 2024-06-21 LAB
BASOPHILS # BLD AUTO: 0.05 THOUSANDS/ÂΜL (ref 0–0.1)
BASOPHILS NFR BLD AUTO: 1 % (ref 0–1)
EOSINOPHIL # BLD AUTO: 0.02 THOUSAND/ÂΜL (ref 0–0.61)
EOSINOPHIL NFR BLD AUTO: 0 % (ref 0–6)
ERYTHROCYTE [DISTWIDTH] IN BLOOD BY AUTOMATED COUNT: 15 % (ref 11.6–15.1)
HCT VFR BLD AUTO: 40.2 % (ref 34.8–46.1)
HGB BLD-MCNC: 12.8 G/DL (ref 11.5–15.4)
IMM GRANULOCYTES # BLD AUTO: 0.08 THOUSAND/UL (ref 0–0.2)
IMM GRANULOCYTES NFR BLD AUTO: 1 % (ref 0–2)
LYMPHOCYTES # BLD AUTO: 1.24 THOUSANDS/ÂΜL (ref 0.6–4.47)
LYMPHOCYTES NFR BLD AUTO: 15 % (ref 14–44)
MCH RBC QN AUTO: 29.4 PG (ref 26.8–34.3)
MCHC RBC AUTO-ENTMCNC: 31.8 G/DL (ref 31.4–37.4)
MCV RBC AUTO: 92 FL (ref 82–98)
MONOCYTES # BLD AUTO: 1.52 THOUSAND/ÂΜL (ref 0.17–1.22)
MONOCYTES NFR BLD AUTO: 18 % (ref 4–12)
NEUTROPHILS # BLD AUTO: 5.6 THOUSANDS/ÂΜL (ref 1.85–7.62)
NEUTS SEG NFR BLD AUTO: 65 % (ref 43–75)
NRBC BLD AUTO-RTO: 0 /100 WBCS
PLATELET # BLD AUTO: 239 THOUSANDS/UL (ref 149–390)
PMV BLD AUTO: 10.3 FL (ref 8.9–12.7)
RBC # BLD AUTO: 4.36 MILLION/UL (ref 3.81–5.12)
WBC # BLD AUTO: 8.51 THOUSAND/UL (ref 4.31–10.16)

## 2024-06-21 PROCEDURE — 36415 COLL VENOUS BLD VENIPUNCTURE: CPT

## 2024-06-21 PROCEDURE — 85025 COMPLETE CBC W/AUTO DIFF WBC: CPT

## 2024-06-21 NOTE — TELEPHONE ENCOUNTER
S/w pt and advised of same. Pt verbalized understanding and appreciation.     Pt will have cbc done today. Pt advised she will be contacted if WBC continues to be elevated to f/u with PCP.

## 2024-06-24 ENCOUNTER — TRANSCRIBE ORDERS (OUTPATIENT)
Dept: PAIN MEDICINE | Facility: CLINIC | Age: 79
End: 2024-06-24

## 2024-07-10 ENCOUNTER — HOSPITAL ENCOUNTER (OUTPATIENT)
Facility: AMBULARY SURGERY CENTER | Age: 79
Setting detail: OUTPATIENT SURGERY
Discharge: HOME | End: 2024-07-10
Attending: PHYSICAL MEDICINE & REHABILITATION | Admitting: PHYSICAL MEDICINE & REHABILITATION
Payer: MEDICARE

## 2024-07-10 ENCOUNTER — APPOINTMENT (OUTPATIENT)
Dept: RADIOLOGY | Facility: HOSPITAL | Age: 79
End: 2024-07-10
Payer: MEDICARE

## 2024-07-10 VITALS
HEIGHT: 61 IN | OXYGEN SATURATION: 96 % | RESPIRATION RATE: 18 BRPM | HEART RATE: 74 BPM | WEIGHT: 182 LBS | BODY MASS INDEX: 34.36 KG/M2 | TEMPERATURE: 97.7 F | SYSTOLIC BLOOD PRESSURE: 138 MMHG | DIASTOLIC BLOOD PRESSURE: 65 MMHG

## 2024-07-10 PROCEDURE — NC001 PR NO CHARGE: Performed by: PHYSICAL MEDICINE & REHABILITATION

## 2024-07-10 PROCEDURE — 63650 IMPLANT NEUROELECTRODES: CPT | Performed by: PHYSICAL MEDICINE & REHABILITATION

## 2024-07-10 PROCEDURE — C1787 PATIENT PROGR, NEUROSTIM: HCPCS | Performed by: PHYSICAL MEDICINE & REHABILITATION

## 2024-07-10 RX ORDER — CEFAZOLIN SODIUM 2 G/50ML
2000 SOLUTION INTRAVENOUS ONCE
Status: COMPLETED | OUTPATIENT
Start: 2024-07-10 | End: 2024-07-10

## 2024-07-10 RX ORDER — LIDOCAINE HCL/EPINEPHRINE/PF 2%-1:200K
VIAL (ML) INJECTION AS NEEDED
Status: DISCONTINUED | OUTPATIENT
Start: 2024-07-10 | End: 2024-07-10 | Stop reason: HOSPADM

## 2024-07-10 RX ADMIN — CEFAZOLIN SODIUM 2000 MG: 2 SOLUTION INTRAVENOUS at 15:12

## 2024-07-10 NOTE — H&P
History of Present Illness: The patient is a 79 y.o. female who presents with complaints of low back pain    Past Medical History:   Diagnosis Date    Arthritis     Fibromyalgia, primary     Sciatica        Past Surgical History:   Procedure Laterality Date    COLON SURGERY      colostomy    EPIDURAL BLOCK INJECTION N/A 08/25/2023    Procedure: L5-S1 LUMBAR epidural steroid injection (75959);  Surgeon: Hector Mora DO;  Location: [facility] MAIN OR;  Service: Pain Management     [place] INJECT SI JOINT ARTHRGRPHY&/ANES/STEROID W/PRIMO Bilateral 06/09/2023    Procedure: SACROILIAC joint injection (55431 );  Surgeon: Endy Christopher MD;  Location: [facility] MAIN OR;  Service: Pain Management     SPINAL FUSION      SPINE SURGERY         No current facility-administered medications for this encounter.    No Known Allergies    Physical Exam:   Vitals:    07/10/24 1415   BP: 132/61   Pulse: 56   Resp: 18   Temp: 97.7 °F (36.5 °C)   SpO2: 97%     General: Awake, Alert, Oriented x 3, Mood and affect appropriate  Respiratory: Respirations even and unlabored  Cardiovascular: Peripheral pulses intact; no edema  Musculoskeletal Exam: Tenderness palpation bilateral lumbar paraspinals    ASA Score: 2    Patient/Chart Verification  Patient ID Verified: Verbal, Armband  ID Band Applied: Yes  H&P( within 30 days) Verified: Yes  Interval H&P(within 24 hr) Complete (required for Outpatients and Surgery Admit only): To be obtained in the Pre-Procedure area  Beta Blocker given : N/A  Pre-op Lab/Test Results Available: In chart  Pregnancy Lab Collected: N/A comment  Does Patient Have a Prosthetic Device/Implant: Yes    Assessment: Medtronic spinal cord stimulator    Plan:  Medtronic spinal cord stimulator

## 2024-07-10 NOTE — OP NOTE
OPERATIVE REPORT  PATIENT NAME: Maria Elena Sandoval    :  1945  MRN: [id number]  Pt Location: [facility] MINOR/PAIN ROOM 01    SURGERY DATE: 7/10/2024    Surgeons and Role:     * Hector Mora, DO - Primary    Preop Diagnosis:  Chronic pain syndrome [G89.4]  Lumbar radiculopathy [M54.16]    Post-Op Diagnosis Codes:     * Chronic pain syndrome [G89.4]     * Lumbar radiculopathy [M54.16]    Procedure(s):  Bilateral - MEDTRONIC SCS TRIAL      Indication: Intractable low back and leg pain.   Preoperative Diagnosis: 1. Chronic pain syndrome. 2. Postlaminectomy syndrome. 3. Lumbar radiculopathy.   Postoperative Diagnosis: 1. Chronic pain syndrome. 2. Postlaminectomy syndrome. 3. Lumbar radiculopathy.  EBL: none  Specimens: not applicable  Procedure: Spinal cord stimulator trial.    The patient was identified and evaluated in the preoperative holding area. Risks, benefits, and alternatives, and a team approach were discussed, and the pertinent surgical site was verified and marked with initials. The patient had the opportunity to ask questions, and wished to proceed.    The patient was transported to the procedure room, at which time they were placed in the prone position after appropriate IV placement. The patient received preoperative antibiotics in the form of 2 g of Ancef. The patient was then prepped and draped with ChloraPrep, Prevail and sterile drapes. A procedural pause was conducted to verify: patient identification, patient allergies, completion of antibiotic prophylaxis, and site of entry.     Under fluoroscopic guidance, the T12-L1 interspace was identified. The skin and underlying subcutaneous tissue was anesthetized by an infiltration of a 1:1 mixture 1% lidocaine with 1:200,000 epinephrine additive. A 14-gauge Coude epidural needle was advanced into the interlaminar space at T12-L1 without difficulty. Epidural access was obtained using a loss of resistance technique to saline. The epidural spinal cord  stimulation lead was advanced to T11 but unfortunately multiple attempts were made without safe passage in the epidural space beyond T11 to our target endpoint of T11.  Troubleshooting approaches were tried and failed on multiple attempts.  Unfortunately decision was made to abort the procedure.  The needle was subsequently withdrawn and the patient was transferred to the postoperative room without difficulty.  Vitals checked and she remained within normal limits.  After close observation in the outpatient holding area the patient was dismissed with family.      Medtronic    SIGNATURE: Hector Mora,   DATE: July 10, 2024  TIME: 4:21 PM

## 2024-07-11 ENCOUNTER — TELEPHONE (OUTPATIENT)
Dept: PAIN MEDICINE | Facility: CLINIC | Age: 79
End: 2024-07-11

## 2024-07-11 NOTE — TELEPHONE ENCOUNTER
S/p pt who is sore from procedure and took tylenol with relief noted. Pt aware she can use ice for pain 20 mins off and on, if needed. Pt will check site for signs of infection and is aware no submersion for 2 days.  Pt will f/u at appt on 7/16 for next steps.

## 2024-07-16 ENCOUNTER — OFFICE VISIT (OUTPATIENT)
Dept: PAIN MEDICINE | Facility: CLINIC | Age: 79
End: 2024-07-16
Payer: MEDICARE

## 2024-07-16 VITALS
BODY MASS INDEX: 34.55 KG/M2 | DIASTOLIC BLOOD PRESSURE: 75 MMHG | SYSTOLIC BLOOD PRESSURE: 145 MMHG | HEIGHT: 61 IN | WEIGHT: 183 LBS | HEART RATE: 85 BPM

## 2024-07-16 DIAGNOSIS — M51.24 THORACIC DISC HERNIATION: ICD-10-CM

## 2024-07-16 DIAGNOSIS — M54.42 CHRONIC BILATERAL LOW BACK PAIN WITH BILATERAL SCIATICA: ICD-10-CM

## 2024-07-16 DIAGNOSIS — G89.29 CHRONIC BILATERAL LOW BACK PAIN WITH BILATERAL SCIATICA: ICD-10-CM

## 2024-07-16 DIAGNOSIS — M54.41 CHRONIC BILATERAL LOW BACK PAIN WITH BILATERAL SCIATICA: ICD-10-CM

## 2024-07-16 DIAGNOSIS — G89.4 CHRONIC PAIN SYNDROME: Primary | ICD-10-CM

## 2024-07-16 DIAGNOSIS — S22.050A COMPRESSION FRACTURE OF T5 VERTEBRA, INITIAL ENCOUNTER (HCC): ICD-10-CM

## 2024-07-16 PROCEDURE — 99214 OFFICE O/P EST MOD 30 MIN: CPT | Performed by: PHYSICAL MEDICINE & REHABILITATION

## 2024-07-16 NOTE — PROGRESS NOTES
Pain Medicine Follow-Up Note    Assessment:  1. Chronic pain syndrome    2. Compression fracture of T5 vertebra, initial encounter ([facility], located within [facility])    3. Chronic bilateral low back pain with bilateral sciatica    4. Thoracic disc herniation        Plan:  Orders Placed This Encounter   Procedures   • Ambulatory referral to Neurosurgery     Standing Status:   Future     Standing Expiration Date:   7/16/2025     Referral Priority:   Routine     Referral Type:   Consult - AMB     Referral Reason:   Specialty Services Required     Referred to Provider:   Karlos Colmenares MD     Requested Specialty:   Neurosurgery     Number of Visits Requested:   1     Expiration Date:   7/16/2025       Ms. Sandoval is a pleasant 79-year-old female presents to [facility] pain [facility] for follow-up and reevaluation.  Unfortunately we previously attempted a Medtronic spinal cord stimulator trial on July 10, 2024.  Despite obtaining epidural access we were unable to drive the Medtronic spinal cord lead beyond T11 likely in relation to the intervening ossification in the epidural space as identified on the MRI thoracic spine.  Multiple attempts and troubleshooting techniques were attempted without success and the procedure was ultimately aborted.  After further discussion today patient wished to proceed with an open trial and a referral has been placed with Dr. Colmenares neurosurgery.  Will be available and follow-up as needed.  All questions answered, patient is agreeable with plan.    History of Present Illness:    Maria Elena Sandoval is a 79 y.o. female who presents to [facility] for interval re-evaluation of the above stated pain complaints. The patient has a past medical and chronic pain history as outlined in the assessment section.  Patient presents for follow-up and reevaluation regarding ongoing low back pain with radiating symptoms into the left lower extremity currently rated 6-8 out of 10 and greatly impacting quality  of life and activities of daily living.  Presents for follow-up after aborted spinal cord stimulator trial    Other than as stated above, the patient denies any interval changes in medications, medical condition, mental condition, symptoms, or allergies since the last office visit.         Review of Systems:    Review of Systems   Constitutional:  Negative for unexpected weight change.   HENT:  Negative for ear pain.    Eyes:  Negative for visual disturbance.   Respiratory:  Negative for shortness of breath and wheezing.    Gastrointestinal:  Negative for abdominal pain.   Musculoskeletal:  Positive for back pain and gait problem.        Decreased ROM, joint and muscle pain,   Neurological:  Positive for weakness and numbness.   Psychiatric/Behavioral:  Positive for decreased concentration, dysphoric mood and sleep disturbance.          Past Medical History:   Diagnosis Date   • Arthritis    • Fibromyalgia, primary    • Sciatica        Past Surgical History:   Procedure Laterality Date   • COLON SURGERY      colostomy   • EPIDURAL BLOCK INJECTION N/A 08/25/2023    Procedure: L5-S1 LUMBAR epidural steroid injection (61542);  Surgeon: Hector Mora DO;  Location: [facility] MAIN OR;  Service: Pain Management    • [place] INJECT SI JOINT ARTHRGRPHY&/ANES/STEROID W/PRIMO Bilateral 06/09/2023    Procedure: SACROILIAC joint injection ([id number] );  Surgeon: Endy Christopher MD;  Location: [facility] MAIN OR;  Service: Pain Management    • [place] PRQ IMPLTJ NSTIM ELECTRODE ARRAY EPIDURAL Bilateral 7/10/2024    Procedure: MEDTRONIC SCS TRIAL;  Surgeon: Hector Mora DO;  Location: [facility] MAIN OR;  Service: Pain Management    • SPINAL FUSION     • SPINE SURGERY         Family History   Problem Relation Age of Onset   • No Known Problems Mother    • No Known Problems Father        Social History     Occupational History   • Not on file   Tobacco Use   • Smoking status: Former     Types: Cigarettes   • Smokeless tobacco: Never  "  Substance and Sexual Activity   • Alcohol use: Never   • Drug use: Never   • Sexual activity: Never         Current Outpatient Medications:   •  candesartan (ATACAND) 32 MG tablet, Take 32 mg by mouth daily, Disp: , Rfl:   •  carvedilol (COREG) 12.5 mg tablet, Take 12.5 mg by mouth 2 (two) times a day with meals, Disp: , Rfl:   •  ibuprofen (Advil) 200 mg tablet, Take by mouth every 6 (six) hours as needed for mild pain, Disp: , Rfl:   •  irbesartan (AVAPRO) 150 mg tablet, , Disp: , Rfl:   •  Multiple Vitamin (multivitamin) tablet, Take 1 tablet by mouth daily, Disp: , Rfl:   •  pantoprazole (PROTONIX) 40 mg tablet, , Disp: , Rfl:   •  prednisoLONE 5 MG (21) TBPK, , Disp: , Rfl:   •  venlafaxine (EFFEXOR-XR) 75 mg 24 hr capsule, Take 75 mg by mouth daily, Disp: , Rfl:   •  zolpidem (AMBIEN) 5 mg tablet, , Disp: , Rfl:   •  calcium carbonate (OS-HUE) 600 MG tablet, Take 600 mg by mouth daily (Patient not taking: Reported on 7/16/2024), Disp: , Rfl:     No Known Allergies    Physical Exam:    /75   Pulse 85   Ht 5' 1\" (1.549 m)   Wt 83 kg (183 lb)   BMI 34.58 kg/m²     Constitutional:normal, well developed, well nourished, alert, in no distress and non-toxic and no overt pain behavior.  Eyes:anicteric  HEENT:grossly intact  Neck:supple, symmetric, trachea midline and no masses   Pulmonary:even and unlabored  Cardiovascular:No edema or pitting edema present  Skin:Normal without rashes or lesions and well hydrated  Psychiatric:Mood and affect appropriate  Neurologic:Cranial Nerves II-XII grossly intact  Musculoskeletal:antalgic and ambulates with rolling walker      Imaging  No orders to display         Orders Placed This Encounter   Procedures   • Ambulatory referral to Neurosurgery     "

## 2024-08-07 ENCOUNTER — OFFICE VISIT (OUTPATIENT)
Dept: NEUROSURGERY | Facility: CLINIC | Age: 79
End: 2024-08-07
Payer: MEDICARE

## 2024-08-07 VITALS
BODY MASS INDEX: 34.55 KG/M2 | DIASTOLIC BLOOD PRESSURE: 80 MMHG | HEART RATE: 85 BPM | OXYGEN SATURATION: 98 % | TEMPERATURE: 97.9 F | WEIGHT: 183 LBS | RESPIRATION RATE: 18 BRPM | HEIGHT: 61 IN | SYSTOLIC BLOOD PRESSURE: 126 MMHG

## 2024-08-07 DIAGNOSIS — G89.29 CHRONIC BILATERAL LOW BACK PAIN WITH BILATERAL SCIATICA: ICD-10-CM

## 2024-08-07 DIAGNOSIS — S22.050A COMPRESSION FRACTURE OF T5 VERTEBRA, INITIAL ENCOUNTER (HCC): Primary | ICD-10-CM

## 2024-08-07 DIAGNOSIS — G89.4 CHRONIC PAIN SYNDROME: ICD-10-CM

## 2024-08-07 DIAGNOSIS — M54.42 CHRONIC BILATERAL LOW BACK PAIN WITH BILATERAL SCIATICA: ICD-10-CM

## 2024-08-07 DIAGNOSIS — M51.24 THORACIC DISC HERNIATION: ICD-10-CM

## 2024-08-07 DIAGNOSIS — M54.41 CHRONIC BILATERAL LOW BACK PAIN WITH BILATERAL SCIATICA: ICD-10-CM

## 2024-08-07 DIAGNOSIS — M54.16 LUMBAR RADICULOPATHY: ICD-10-CM

## 2024-08-07 PROCEDURE — 99215 OFFICE O/P EST HI 40 MIN: CPT | Performed by: STUDENT IN AN ORGANIZED HEALTH CARE EDUCATION/TRAINING PROGRAM

## 2024-08-07 NOTE — PROGRESS NOTES
Office Note - Neurosurgery   Maria Elena Sandoval 79 y.o. female MRN: [id number]      Assessment/Plan:    Maria Elena Sandoval is a 79 year old female with a history of chronic back pain and lumbar radiculopathy referred for spinal cord stimulator trial. She recently underwent an attempted percutaneous trial with Dr Mora, but given challenging anatomy the leads were unable to be placed and she was referred for consideration of an open trial. I did review her MRI L and T spine. She has multilevel degenerative changes and several prior mild compression deformities. 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 I do think that this could also be a contributor to her pain, especially the axial lower back pain she is experiencing. Unfortunately given the degree of angulation I would not recommend laminectomy for paddle placement as this could worsen the degree of kyphosis. We did discuss the option of repeating the percutaneous placement of leads in the OR under general anesthesia. In the setting of muscle relaxation and general anesthesia it can be easier to gain epidural access. Would plan for a buried trial with externalized extension wires given the prior difficulty in lead placement. Given that she has failed multiple prior modalities including medical management, PT, surgery and TAI I think this would be a reasonable option. We discussed risks including bleeding, infection, CSF leak, device malfunction. There is also increased risk that I will not be able to gain access percutaneously and will need to abort the procedure. If lead placement is successful I would have her return the following week for either lead and extension wire removal or battery placement pending the results of the trial. She is agreeable to this strategy and signed consent in clinic today. We will work to schedule a surgical date.     I have spent a total time of 45 minutes in caring for this patient on the day of the  visit/encounter including Diagnostic results, Prognosis, Risk factor reductions, Impressions, Documenting in the medical record, and Reviewing / ordering tests, medicine, procedures  .       Subjective/Objective     Chief Complaint    Follow-up (Follow-up- ref back from [facility] Dr. Mora for SCS Open Trial /MRI Tspine- 3/7/24 /)       ROHIT Sandoval is a 79 year old female with a history of chronic back pain and lumbar radiculopathy referred for spinal cord stimulator trial. See assessment and plan.    ROS  ROS personally reviewed and updated.    Review of Systems   Constitutional: Negative.    HENT: Negative.     Eyes: Negative.    Respiratory: Negative.     Cardiovascular: Negative.    Gastrointestinal: Negative.    Endocrine: Negative.    Genitourinary:  Positive for difficulty urinating (incontinence).   Musculoskeletal:  Positive for back pain (LBP left side radiates to left hip and down left leg) and gait problem (sometimes can be off balance uses cane, no falls). Negative for myalgias.        Has pain with walking and uses a walker at home    Skin: Negative.    Allergic/Immunologic: Negative.    Neurological:  Positive for weakness (sometimes legs will feel weak L>R) and numbness (neuropathy in feet).   Hematological: Negative.    Psychiatric/Behavioral: Negative.         Family History    Family History   Problem Relation Age of Onset    No Known Problems Mother     No Known Problems Father        Social History    Social History     Socioeconomic History    Marital status:      Spouse name: Not on file    Number of children: Not on file    Years of education: Not on file    Highest education level: Not on file   Occupational History    Not on file   Tobacco Use    Smoking status: Former     Types: Cigarettes    Smokeless tobacco: Never   Substance and Sexual Activity    Alcohol use: Never    Drug use: Never    Sexual activity: Never   Other Topics Concern    Not on file   Social History Narrative     Not on file     Social Determinants of Health     Financial Resource Strain: Not on file   Food Insecurity: Not on file   Transportation Needs: Not on file   Physical Activity: Not on file   Stress: Not on file   Social Connections: Not on file   Intimate Partner Violence: Not on file   Housing Stability: Not on file       Past Medical History    Past Medical History:   Diagnosis Date    Arthritis     Fibromyalgia, primary     Sciatica        Surgical History    Past Surgical History:   Procedure Laterality Date    COLON SURGERY      colostomy    EPIDURAL BLOCK INJECTION N/A 08/25/2023    Procedure: L5-S1 LUMBAR epidural steroid injection (41131);  Surgeon: Hector Mora DO;  Location: [facility] MAIN OR;  Service: Pain Management     [place] INJECT SI JOINT ARTHRGRPHY&/ANES/STEROID W/PRIMO Bilateral 06/09/2023    Procedure: SACROILIAC joint injection (03340 );  Surgeon: Endy Christopher MD;  Location: [facility] MAIN OR;  Service: Pain Management     [place] PRQ IMPLTJ NSTIM ELECTRODE ARRAY EPIDURAL Bilateral 7/10/2024    Procedure: MEDTRONIC SCS TRIAL;  Surgeon: Hector Mora DO;  Location: [facility] MAIN OR;  Service: Pain Management     SPINAL FUSION      SPINE SURGERY         Medications      Current Outpatient Medications:     candesartan (ATACAND) 32 MG tablet, Take 32 mg by mouth daily, Disp: , Rfl:     carvedilol (COREG) 12.5 mg tablet, Take 12.5 mg by mouth 2 (two) times a day with meals, Disp: , Rfl:     ibuprofen (Advil) 200 mg tablet, Take by mouth every 6 (six) hours as needed for mild pain, Disp: , Rfl:     irbesartan (AVAPRO) 150 mg tablet, , Disp: , Rfl:     Multiple Vitamin (multivitamin) tablet, Take 1 tablet by mouth daily, Disp: , Rfl:     pantoprazole (PROTONIX) 40 mg tablet, , Disp: , Rfl:     prednisoLONE 5 MG (21) TBPK, , Disp: , Rfl:     venlafaxine (EFFEXOR-XR) 75 mg 24 hr capsule, Take 75 mg by mouth daily, Disp: , Rfl:     zolpidem (AMBIEN) 5 mg tablet, , Disp: , Rfl:     calcium carbonate  "(OS-HUE) 600 MG tablet, Take 600 mg by mouth daily (Patient not taking: Reported on 7/16/2024), Disp: , Rfl:     Allergies    No Known Allergies    Physical Exam    Vitals:  Blood pressure 126/80, pulse 85, temperature 97.9 °F (36.6 °C), temperature source Temporal, resp. rate 18, height 5' 1\" (1.549 m), weight 83 kg (183 lb), SpO2 98%.,Body mass index is 34.58 kg/m².    Physical Exam  Neurologic Exam  Awake and alert  Oriented and appropriate  Grossly 5/5 throughout  "

## 2024-08-08 RX ORDER — CEFAZOLIN SODIUM 2 G/50ML
2000 SOLUTION INTRAVENOUS ONCE
OUTPATIENT
Start: 2024-08-08 | End: 2024-08-08

## 2024-08-08 RX ORDER — CHLORHEXIDINE GLUCONATE ORAL RINSE 1.2 MG/ML
15 SOLUTION DENTAL ONCE
OUTPATIENT
Start: 2024-08-08 | End: 2024-08-08

## 2024-08-27 ENCOUNTER — HOSPITAL ENCOUNTER (OUTPATIENT)
Facility: HOSPITAL | Age: 79
Discharge: HOME | End: 2024-08-27
Attending: STUDENT IN AN ORGANIZED HEALTH CARE EDUCATION/TRAINING PROGRAM
Payer: MEDICARE

## 2024-08-27 VITALS — HEIGHT: 61 IN | BODY MASS INDEX: 34.55 KG/M2 | WEIGHT: 183 LBS

## 2024-08-27 PROCEDURE — 77080 DXA BONE DENSITY AXIAL: CPT

## 2024-09-30 ENCOUNTER — OFFICE VISIT (OUTPATIENT)
Dept: UROLOGY | Facility: CLINIC | Age: 79
End: 2024-09-30
Payer: MEDICARE

## 2024-09-30 VITALS
HEIGHT: 61 IN | BODY MASS INDEX: 30.96 KG/M2 | OXYGEN SATURATION: 95 % | HEART RATE: 79 BPM | WEIGHT: 164 LBS | SYSTOLIC BLOOD PRESSURE: 120 MMHG | DIASTOLIC BLOOD PRESSURE: 70 MMHG

## 2024-09-30 DIAGNOSIS — R32 URINARY INCONTINENCE, UNSPECIFIED TYPE: Primary | ICD-10-CM

## 2024-09-30 LAB — POST-VOID RESIDUAL VOLUME, ML POC: 22 ML

## 2024-09-30 PROCEDURE — 51798 US URINE CAPACITY MEASURE: CPT

## 2024-09-30 PROCEDURE — 99213 OFFICE O/P EST LOW 20 MIN: CPT

## 2024-10-08 ENCOUNTER — OFFICE VISIT (OUTPATIENT)
Dept: LAB | Facility: HOSPITAL | Age: 79
End: 2024-10-08
Payer: MEDICARE

## 2024-10-08 ENCOUNTER — APPOINTMENT (OUTPATIENT)
Dept: LAB | Facility: HOSPITAL | Age: 79
End: 2024-10-08
Payer: MEDICARE

## 2024-10-08 DIAGNOSIS — M51.24 THORACIC DISC HERNIATION: ICD-10-CM

## 2024-10-08 DIAGNOSIS — M54.16 LUMBAR RADICULOPATHY: ICD-10-CM

## 2024-10-08 DIAGNOSIS — G89.29 CHRONIC BILATERAL LOW BACK PAIN WITH BILATERAL SCIATICA: ICD-10-CM

## 2024-10-08 DIAGNOSIS — G89.4 CHRONIC PAIN SYNDROME: ICD-10-CM

## 2024-10-08 DIAGNOSIS — M54.41 CHRONIC BILATERAL LOW BACK PAIN WITH BILATERAL SCIATICA: ICD-10-CM

## 2024-10-08 DIAGNOSIS — M54.42 CHRONIC BILATERAL LOW BACK PAIN WITH BILATERAL SCIATICA: ICD-10-CM

## 2024-10-08 DIAGNOSIS — S22.050A COMPRESSION FRACTURE OF T5 VERTEBRA, INITIAL ENCOUNTER (HCC): ICD-10-CM

## 2024-10-08 LAB
ALBUMIN SERPL BCG-MCNC: 3.9 G/DL (ref 3.5–5)
ALP SERPL-CCNC: 57 U/L (ref 34–104)
ALT SERPL W P-5'-P-CCNC: 15 U/L (ref 7–52)
ANION GAP SERPL CALCULATED.3IONS-SCNC: 7 MMOL/L (ref 4–13)
APTT PPP: 29 SECONDS (ref 23–34)
AST SERPL W P-5'-P-CCNC: 16 U/L (ref 13–39)
BASOPHILS # BLD MANUAL: 0 THOUSAND/UL (ref 0–0.1)
BASOPHILS NFR MAR MANUAL: 0 % (ref 0–1)
BILIRUB SERPL-MCNC: 0.42 MG/DL (ref 0.2–1)
BUN SERPL-MCNC: 24 MG/DL (ref 5–25)
CALCIUM SERPL-MCNC: 8.9 MG/DL (ref 8.4–10.2)
CHLORIDE SERPL-SCNC: 105 MMOL/L (ref 96–108)
CO2 SERPL-SCNC: 26 MMOL/L (ref 21–32)
CREAT SERPL-MCNC: 0.75 MG/DL (ref 0.6–1.3)
EOSINOPHIL # BLD MANUAL: 0.19 THOUSAND/UL (ref 0–0.4)
EOSINOPHIL NFR BLD MANUAL: 2 % (ref 0–6)
ERYTHROCYTE [DISTWIDTH] IN BLOOD BY AUTOMATED COUNT: 15.8 % (ref 11.6–15.1)
EST. AVERAGE GLUCOSE BLD GHB EST-MCNC: 123 MG/DL
GFR SERPL CREATININE-BSD FRML MDRD: 77 ML/MIN/1.73SQ M
GLUCOSE SERPL-MCNC: 125 MG/DL (ref 65–140)
HBA1C MFR BLD: 5.9 %
HCT VFR BLD AUTO: 37.6 % (ref 34.8–46.1)
HGB BLD-MCNC: 11.8 G/DL (ref 11.5–15.4)
INR PPP: 1.07 (ref 0.85–1.19)
LYMPHOCYTES # BLD AUTO: 0.58 THOUSAND/UL (ref 0.6–4.47)
LYMPHOCYTES # BLD AUTO: 3 % (ref 14–44)
MCH RBC QN AUTO: 28 PG (ref 26.8–34.3)
MCHC RBC AUTO-ENTMCNC: 31.4 G/DL (ref 31.4–37.4)
MCV RBC AUTO: 89 FL (ref 82–98)
METAMYELOCYTE ABSOLUTE CT: 0.1 THOUSAND/UL (ref 0–0.1)
METAMYELOCYTES NFR BLD MANUAL: 1 % (ref 0–1)
MONOCYTES # BLD AUTO: 1.44 THOUSAND/UL (ref 0–1.22)
MONOCYTES NFR BLD: 15 % (ref 4–12)
MYELOCYTE ABSOLUTE CT: 0.1 THOUSAND/UL (ref 0–0.1)
MYELOCYTES NFR BLD MANUAL: 1 % (ref 0–1)
NEUTROPHILS # BLD MANUAL: 7.19 THOUSAND/UL (ref 1.85–7.62)
NEUTS BAND NFR BLD MANUAL: 3 % (ref 0–8)
NEUTS SEG NFR BLD AUTO: 72 % (ref 43–75)
NRBC BLD AUTO-RTO: 1 /100 WBC (ref 0–2)
PLATELET # BLD AUTO: 230 THOUSANDS/UL (ref 149–390)
PLATELET BLD QL SMEAR: ADEQUATE
PMV BLD AUTO: 9.7 FL (ref 8.9–12.7)
POLYCHROMASIA BLD QL SMEAR: PRESENT
POTASSIUM SERPL-SCNC: 4.5 MMOL/L (ref 3.5–5.3)
PROT SERPL-MCNC: 7.6 G/DL (ref 6.4–8.4)
PROTHROMBIN TIME: 14.4 SECONDS (ref 12.3–15)
RBC # BLD AUTO: 4.22 MILLION/UL (ref 3.81–5.12)
RBC MORPH BLD: PRESENT
SMUDGE CELLS BLD QL SMEAR: PRESENT
SODIUM SERPL-SCNC: 138 MMOL/L (ref 135–147)
VARIANT LYMPHS # BLD AUTO: 3 %
WBC # BLD AUTO: 9.59 THOUSAND/UL (ref 4.31–10.16)

## 2024-10-08 PROCEDURE — 93005 ELECTROCARDIOGRAM TRACING: CPT

## 2024-10-08 PROCEDURE — 80053 COMPREHEN METABOLIC PANEL: CPT

## 2024-10-08 PROCEDURE — 83036 HEMOGLOBIN GLYCOSYLATED A1C: CPT

## 2024-10-08 PROCEDURE — 85007 BL SMEAR W/DIFF WBC COUNT: CPT

## 2024-10-08 PROCEDURE — 85027 COMPLETE CBC AUTOMATED: CPT

## 2024-10-08 PROCEDURE — 36415 COLL VENOUS BLD VENIPUNCTURE: CPT

## 2024-10-08 PROCEDURE — 85730 THROMBOPLASTIN TIME PARTIAL: CPT

## 2024-10-08 PROCEDURE — 85610 PROTHROMBIN TIME: CPT

## 2024-10-11 ENCOUNTER — TELEPHONE (OUTPATIENT)
Dept: NEUROSURGERY | Facility: CLINIC | Age: 79
End: 2024-10-11

## 2024-10-11 LAB
ATRIAL RATE: 74 BPM
P AXIS: 41 DEGREES
PR INTERVAL: 184 MS
QRS AXIS: 22 DEGREES
QRSD INTERVAL: 92 MS
QT INTERVAL: 388 MS
QTC INTERVAL: 430 MS
T WAVE AXIS: 28 DEGREES
VENTRICULAR RATE: 74 BPM

## 2024-10-11 PROCEDURE — 93010 ELECTROCARDIOGRAM REPORT: CPT | Performed by: INTERNAL MEDICINE

## 2024-10-11 NOTE — TELEPHONE ENCOUNTER
10/11/2024-Called pt, LMOM to contact PCP office to schedule apt for pre-op clearance prior to 10/22/2024 sx w/Dr Colmenares. Form faxed to office.

## 2024-10-17 ENCOUNTER — APPOINTMENT (OUTPATIENT)
Dept: LAB | Facility: HOSPITAL | Age: 79
End: 2024-10-17
Payer: MEDICARE

## 2024-10-17 DIAGNOSIS — M51.24 THORACIC DISC HERNIATION: ICD-10-CM

## 2024-10-17 DIAGNOSIS — M54.41 CHRONIC BILATERAL LOW BACK PAIN WITH BILATERAL SCIATICA: ICD-10-CM

## 2024-10-17 DIAGNOSIS — M54.16 LUMBAR RADICULOPATHY: ICD-10-CM

## 2024-10-17 DIAGNOSIS — Z79.899 ENCOUNTER FOR LONG-TERM (CURRENT) USE OF MEDICATIONS: ICD-10-CM

## 2024-10-17 DIAGNOSIS — M54.42 CHRONIC BILATERAL LOW BACK PAIN WITH BILATERAL SCIATICA: ICD-10-CM

## 2024-10-17 DIAGNOSIS — G89.29 CHRONIC BILATERAL LOW BACK PAIN WITH BILATERAL SCIATICA: ICD-10-CM

## 2024-10-17 DIAGNOSIS — Z79.01 LONG TERM (CURRENT) USE OF ANTICOAGULANTS: ICD-10-CM

## 2024-10-17 DIAGNOSIS — G89.4 CHRONIC PAIN SYNDROME: ICD-10-CM

## 2024-10-17 DIAGNOSIS — Z01.812 PRE-OPERATIVE LABORATORY EXAMINATION: ICD-10-CM

## 2024-10-17 DIAGNOSIS — S22.050A COMPRESSION FRACTURE OF T5 VERTEBRA, INITIAL ENCOUNTER (HCC): ICD-10-CM

## 2024-10-17 LAB
BACTERIA UR QL AUTO: ABNORMAL /HPF
BILIRUB UR QL STRIP: NEGATIVE
CLARITY UR: CLEAR
COLOR UR: YELLOW
GLUCOSE UR STRIP-MCNC: NEGATIVE MG/DL
HGB UR QL STRIP.AUTO: ABNORMAL
HYALINE CASTS #/AREA URNS LPF: ABNORMAL /LPF
KETONES UR STRIP-MCNC: NEGATIVE MG/DL
LEUKOCYTE ESTERASE UR QL STRIP: ABNORMAL
MUCOUS THREADS UR QL AUTO: ABNORMAL
NITRITE UR QL STRIP: NEGATIVE
NON-SQ EPI CELLS URNS QL MICRO: ABNORMAL /HPF
PH UR STRIP.AUTO: 5.5 [PH]
PROT UR STRIP-MCNC: ABNORMAL MG/DL
RBC #/AREA URNS AUTO: ABNORMAL /HPF
SP GR UR STRIP.AUTO: 1.02 (ref 1–1.03)
UROBILINOGEN UR STRIP-ACNC: <2 MG/DL
WBC #/AREA URNS AUTO: ABNORMAL /HPF

## 2024-10-17 PROCEDURE — 81001 URINALYSIS AUTO W/SCOPE: CPT

## 2024-10-17 PROCEDURE — 87077 CULTURE AEROBIC IDENTIFY: CPT

## 2024-10-17 PROCEDURE — 87086 URINE CULTURE/COLONY COUNT: CPT

## 2024-10-17 PROCEDURE — 87186 SC STD MICRODIL/AGAR DIL: CPT

## 2024-10-20 LAB — BACTERIA UR CULT: ABNORMAL

## 2024-10-21 ENCOUNTER — ANESTHESIA EVENT (OUTPATIENT)
Dept: PERIOP | Facility: HOSPITAL | Age: 79
End: 2024-10-21
Payer: MEDICARE

## 2024-10-21 DIAGNOSIS — N39.0 UTI (URINARY TRACT INFECTION): Primary | ICD-10-CM

## 2024-10-21 RX ORDER — SULFAMETHOXAZOLE AND TRIMETHOPRIM 800; 160 MG/1; MG/1
1 TABLET ORAL EVERY 12 HOURS SCHEDULED
Qty: 6 TABLET | Refills: 0 | Status: SHIPPED | OUTPATIENT
Start: 2024-10-21 | End: 2024-10-24

## 2024-10-21 NOTE — PROGRESS NOTES
Preoperative urine culture came back positive for klebsiella. Will treat as patient is currently scheduled for surgery tomorrow 10/22 for spinal cord stimulator trial with Dr. Colmenares.

## 2024-10-21 NOTE — PRE-PROCEDURE INSTRUCTIONS
Pre-Surgery Instructions:   Medication Instructions    candesartan (ATACAND) 32 MG tablet Hold day of surgery.    carvedilol (COREG) 12.5 mg tablet Take day of surgery.    ibuprofen (Advil) 200 mg tablet Hold starting 10/21/24    irbesartan (AVAPRO) 150 mg tablet Hold day of surgery.    Multiple Vitamin (multivitamin) tablet   Hold starting 10/21/24    pantoprazole (PROTONIX) 40 mg tablet Take day of surgery.    prednisoLONE 5 MG (21) TBPK Take day of surgery.    venlafaxine (EFFEXOR-XR) 75 mg 24 hr capsule Take day of surgery.    zolpidem (AMBIEN) 5 mg tablet Take night before surgery   Medication instructions for day surgery reviewed. Please use only a sip of water to take your instructed medications. Avoid all over the counter vitamins, supplements and NSAIDS for one week prior to surgery per anesthesia guidelines. Tylenol is ok to take as needed.     You will receive a call one business day prior to surgery with an arrival time and hospital directions. If your surgery is scheduled on a Monday, the hospital will be calling you on the Friday prior to your surgery. If you have not heard from anyone by 8pm, please call the hospital supervisor through the hospital  at [phone]. or [place] [phone]).    Do not eat or drink anything after midnight the night before your surgery, including candy, mints, lifesavers, or chewing gum. Do not drink alcohol 24hrs before your surgery. Try not to smoke at least 24hrs before your surgery.       Follow the pre surgery showering instructions as listed in the “My Surgical Experience Booklet” or otherwise provided by your surgeon's office states following Neuro Instructions for pre op showering.. Do not use a blade to shave the surgical area 1 week before surgery. It is okay to use a clean electric clippers up to 24 hours before surgery. Do not apply any lotions, creams, including makeup, cologne, deodorant, or perfumes after showering on the day of your surgery. Do  not use dry shampoo, hair spray, hair gel, or any type of hair products.     No contact lenses, eye make-up, or artificial eyelashes. Remove nail polish, including gel polish, and any artificial, gel, or acrylic nails if possible. Remove all jewelry including rings and body piercing jewelry.     Wear causal clothing that is easy to take on and off. Consider your type of surgery.    Keep any valuables, jewelry, piercings at home. Please bring any specially ordered equipment (sling, braces) if indicated.    Arrange for a responsible person to drive you to and from the hospital on the day of your surgery. Please confirm the visitor policy for the day of your procedure when you receive your phone call with an arrival time.     Call the surgeon's office with any new illnesses, exposures, or additional questions prior to surgery.    Please reference your “My Surgical Experience Booklet” for additional information to prepare for your upcoming surgery.

## 2024-10-22 ENCOUNTER — HOSPITAL ENCOUNTER (OUTPATIENT)
Dept: RADIOLOGY | Facility: HOSPITAL | Age: 79
Setting detail: OUTPATIENT SURGERY
Discharge: HOME | End: 2024-10-22
Payer: MEDICARE

## 2024-10-22 ENCOUNTER — HOSPITAL ENCOUNTER (OUTPATIENT)
Facility: HOSPITAL | Age: 79
Setting detail: OUTPATIENT SURGERY
Discharge: HOME | End: 2024-10-22
Attending: STUDENT IN AN ORGANIZED HEALTH CARE EDUCATION/TRAINING PROGRAM | Admitting: STUDENT IN AN ORGANIZED HEALTH CARE EDUCATION/TRAINING PROGRAM
Payer: MEDICARE

## 2024-10-22 ENCOUNTER — ANESTHESIA (OUTPATIENT)
Dept: PERIOP | Facility: HOSPITAL | Age: 79
End: 2024-10-22
Payer: MEDICARE

## 2024-10-22 VITALS
WEIGHT: 182 LBS | SYSTOLIC BLOOD PRESSURE: 154 MMHG | HEART RATE: 82 BPM | HEIGHT: 61 IN | OXYGEN SATURATION: 92 % | DIASTOLIC BLOOD PRESSURE: 75 MMHG | BODY MASS INDEX: 34.36 KG/M2 | TEMPERATURE: 97.3 F | RESPIRATION RATE: 16 BRPM

## 2024-10-22 DIAGNOSIS — Z98.890 POST-OPERATIVE STATE: Primary | ICD-10-CM

## 2024-10-22 DIAGNOSIS — G89.4 CHRONIC PAIN SYNDROME: ICD-10-CM

## 2024-10-22 PROBLEM — F41.9 ANXIETY: Status: ACTIVE | Noted: 2024-10-22

## 2024-10-22 PROBLEM — I10 HTN (HYPERTENSION): Status: ACTIVE | Noted: 2024-10-22

## 2024-10-22 PROCEDURE — 72070 X-RAY EXAM THORAC SPINE 2VWS: CPT

## 2024-10-22 PROCEDURE — C1787 PATIENT PROGR, NEUROSTIM: HCPCS | Performed by: STUDENT IN AN ORGANIZED HEALTH CARE EDUCATION/TRAINING PROGRAM

## 2024-10-22 PROCEDURE — 63650 IMPLANT NEUROELECTRODES: CPT | Performed by: STUDENT IN AN ORGANIZED HEALTH CARE EDUCATION/TRAINING PROGRAM

## 2024-10-22 PROCEDURE — C1883 ADAPT/EXT, PACING/NEURO LEAD: HCPCS | Performed by: STUDENT IN AN ORGANIZED HEALTH CARE EDUCATION/TRAINING PROGRAM

## 2024-10-22 PROCEDURE — C1778 LEAD, NEUROSTIMULATOR: HCPCS | Performed by: STUDENT IN AN ORGANIZED HEALTH CARE EDUCATION/TRAINING PROGRAM

## 2024-10-22 PROCEDURE — 63650 IMPLANT NEUROELECTRODES: CPT | Performed by: PHYSICIAN ASSISTANT

## 2024-10-22 PROCEDURE — 99024 POSTOP FOLLOW-UP VISIT: CPT | Performed by: STUDENT IN AN ORGANIZED HEALTH CARE EDUCATION/TRAINING PROGRAM

## 2024-10-22 DEVICE — LEAD VECTRIS SURESCAN MRI 1X 8 COMPACT 60CM KIT: Type: IMPLANTABLE DEVICE | Site: SPINE THORACIC | Status: FUNCTIONAL

## 2024-10-22 DEVICE — IMPLANTABLE DEVICE: Type: IMPLANTABLE DEVICE | Site: SPINE THORACIC | Status: FUNCTIONAL

## 2024-10-22 RX ORDER — ONDANSETRON 2 MG/ML
4 INJECTION INTRAMUSCULAR; INTRAVENOUS ONCE AS NEEDED
Status: DISCONTINUED | OUTPATIENT
Start: 2024-10-22 | End: 2024-10-22 | Stop reason: HOSPADM

## 2024-10-22 RX ORDER — ONDANSETRON 4 MG/1
4 TABLET, ORALLY DISINTEGRATING ORAL EVERY 6 HOURS PRN
Status: DISCONTINUED | OUTPATIENT
Start: 2024-10-22 | End: 2024-10-22 | Stop reason: HOSPADM

## 2024-10-22 RX ORDER — ROCURONIUM BROMIDE 10 MG/ML
INJECTION, SOLUTION INTRAVENOUS AS NEEDED
Status: DISCONTINUED | OUTPATIENT
Start: 2024-10-22 | End: 2024-10-22

## 2024-10-22 RX ORDER — CEFAZOLIN SODIUM 2 G/50ML
2000 SOLUTION INTRAVENOUS ONCE
Status: COMPLETED | OUTPATIENT
Start: 2024-10-22 | End: 2024-10-22

## 2024-10-22 RX ORDER — FENTANYL CITRATE 50 UG/ML
INJECTION, SOLUTION INTRAMUSCULAR; INTRAVENOUS AS NEEDED
Status: DISCONTINUED | OUTPATIENT
Start: 2024-10-22 | End: 2024-10-22

## 2024-10-22 RX ORDER — HYDROMORPHONE HCL IN WATER/PF 6 MG/30 ML
0.2 PATIENT CONTROLLED ANALGESIA SYRINGE INTRAVENOUS
Status: DISCONTINUED | OUTPATIENT
Start: 2024-10-22 | End: 2024-10-22 | Stop reason: HOSPADM

## 2024-10-22 RX ORDER — VANCOMYCIN HYDROCHLORIDE 1 G/20ML
INJECTION, POWDER, LYOPHILIZED, FOR SOLUTION INTRAVENOUS AS NEEDED
Status: DISCONTINUED | OUTPATIENT
Start: 2024-10-22 | End: 2024-10-22 | Stop reason: HOSPADM

## 2024-10-22 RX ORDER — OXYCODONE HYDROCHLORIDE 5 MG/1
5 TABLET ORAL EVERY 6 HOURS PRN
Qty: 20 TABLET | Refills: 0 | Status: SHIPPED | OUTPATIENT
Start: 2024-10-22 | End: 2024-10-27

## 2024-10-22 RX ORDER — OXYCODONE AND ACETAMINOPHEN 5; 325 MG/1; MG/1
1 TABLET ORAL EVERY 4 HOURS PRN
Status: DISCONTINUED | OUTPATIENT
Start: 2024-10-22 | End: 2024-10-22 | Stop reason: HOSPADM

## 2024-10-22 RX ORDER — MIDAZOLAM HYDROCHLORIDE 2 MG/2ML
INJECTION, SOLUTION INTRAMUSCULAR; INTRAVENOUS AS NEEDED
Status: DISCONTINUED | OUTPATIENT
Start: 2024-10-22 | End: 2024-10-22

## 2024-10-22 RX ORDER — LIDOCAINE HYDROCHLORIDE 10 MG/ML
0.5 INJECTION, SOLUTION EPIDURAL; INFILTRATION; INTRACAUDAL; PERINEURAL ONCE AS NEEDED
Status: DISCONTINUED | OUTPATIENT
Start: 2024-10-22 | End: 2024-10-22 | Stop reason: HOSPADM

## 2024-10-22 RX ORDER — SODIUM CHLORIDE, SODIUM LACTATE, POTASSIUM CHLORIDE, CALCIUM CHLORIDE 600; 310; 30; 20 MG/100ML; MG/100ML; MG/100ML; MG/100ML
INJECTION, SOLUTION INTRAVENOUS CONTINUOUS PRN
Status: DISCONTINUED | OUTPATIENT
Start: 2024-10-22 | End: 2024-10-22

## 2024-10-22 RX ORDER — SULFAMETHOXAZOLE AND TRIMETHOPRIM 800; 160 MG/1; MG/1
1 TABLET ORAL EVERY 12 HOURS SCHEDULED
Qty: 10 TABLET | Refills: 0 | Status: SHIPPED | OUTPATIENT
Start: 2024-10-24 | End: 2024-10-29

## 2024-10-22 RX ORDER — LIDOCAINE HYDROCHLORIDE 10 MG/ML
INJECTION, SOLUTION EPIDURAL; INFILTRATION; INTRACAUDAL; PERINEURAL AS NEEDED
Status: DISCONTINUED | OUTPATIENT
Start: 2024-10-22 | End: 2024-10-22

## 2024-10-22 RX ORDER — EPHEDRINE SULFATE 50 MG/ML
INJECTION INTRAVENOUS AS NEEDED
Status: DISCONTINUED | OUTPATIENT
Start: 2024-10-22 | End: 2024-10-22

## 2024-10-22 RX ORDER — DEXAMETHASONE SODIUM PHOSPHATE 10 MG/ML
INJECTION, SOLUTION INTRAMUSCULAR; INTRAVENOUS AS NEEDED
Status: DISCONTINUED | OUTPATIENT
Start: 2024-10-22 | End: 2024-10-22

## 2024-10-22 RX ORDER — LIDOCAINE HYDROCHLORIDE AND EPINEPHRINE 10; 10 MG/ML; UG/ML
INJECTION, SOLUTION INFILTRATION; PERINEURAL AS NEEDED
Status: DISCONTINUED | OUTPATIENT
Start: 2024-10-22 | End: 2024-10-22 | Stop reason: HOSPADM

## 2024-10-22 RX ORDER — FENTANYL CITRATE/PF 50 MCG/ML
25 SYRINGE (ML) INJECTION
Status: DISCONTINUED | OUTPATIENT
Start: 2024-10-22 | End: 2024-10-22 | Stop reason: HOSPADM

## 2024-10-22 RX ORDER — SODIUM CHLORIDE, SODIUM LACTATE, POTASSIUM CHLORIDE, CALCIUM CHLORIDE 600; 310; 30; 20 MG/100ML; MG/100ML; MG/100ML; MG/100ML
125 INJECTION, SOLUTION INTRAVENOUS CONTINUOUS
Status: DISCONTINUED | OUTPATIENT
Start: 2024-10-22 | End: 2024-10-22 | Stop reason: HOSPADM

## 2024-10-22 RX ORDER — ONDANSETRON 2 MG/ML
INJECTION INTRAMUSCULAR; INTRAVENOUS AS NEEDED
Status: DISCONTINUED | OUTPATIENT
Start: 2024-10-22 | End: 2024-10-22

## 2024-10-22 RX ORDER — CHLORHEXIDINE GLUCONATE ORAL RINSE 1.2 MG/ML
15 SOLUTION DENTAL ONCE
Status: COMPLETED | OUTPATIENT
Start: 2024-10-22 | End: 2024-10-22

## 2024-10-22 RX ORDER — ACETAMINOPHEN 325 MG/1
650 TABLET ORAL ONCE AS NEEDED
Status: COMPLETED | OUTPATIENT
Start: 2024-10-22 | End: 2024-10-22

## 2024-10-22 RX ORDER — PHENYLEPHRINE HCL IN 0.9% NACL 1 MG/10 ML
SYRINGE (ML) INTRAVENOUS AS NEEDED
Status: DISCONTINUED | OUTPATIENT
Start: 2024-10-22 | End: 2024-10-22

## 2024-10-22 RX ORDER — PROPOFOL 10 MG/ML
INJECTION, EMULSION INTRAVENOUS AS NEEDED
Status: DISCONTINUED | OUTPATIENT
Start: 2024-10-22 | End: 2024-10-22

## 2024-10-22 RX ADMIN — DEXAMETHASONE SODIUM PHOSPHATE 10 MG: 10 INJECTION, SOLUTION INTRAMUSCULAR; INTRAVENOUS at 09:41

## 2024-10-22 RX ADMIN — PROPOFOL 150 MG: 10 INJECTION, EMULSION INTRAVENOUS at 09:34

## 2024-10-22 RX ADMIN — CHLORHEXIDINE GLUCONATE 0.12% ORAL RINSE 15 ML: 1.2 LIQUID ORAL at 08:18

## 2024-10-22 RX ADMIN — ROCURONIUM 40 MG: 50 INJECTION, SOLUTION INTRAVENOUS at 09:34

## 2024-10-22 RX ADMIN — EPHEDRINE SULFATE 20 MG: 50 INJECTION, SOLUTION INTRAVENOUS at 09:49

## 2024-10-22 RX ADMIN — EPHEDRINE SULFATE 10 MG: 50 INJECTION, SOLUTION INTRAVENOUS at 09:52

## 2024-10-22 RX ADMIN — FENTANYL CITRATE 50 MCG: 50 INJECTION INTRAMUSCULAR; INTRAVENOUS at 09:34

## 2024-10-22 RX ADMIN — LIDOCAINE HYDROCHLORIDE 50 MG: 10 INJECTION, SOLUTION EPIDURAL; INFILTRATION; INTRACAUDAL; PERINEURAL at 09:34

## 2024-10-22 RX ADMIN — SUGAMMADEX 200 MG: 100 INJECTION, SOLUTION INTRAVENOUS at 11:15

## 2024-10-22 RX ADMIN — FENTANYL CITRATE 50 MCG: 50 INJECTION INTRAMUSCULAR; INTRAVENOUS at 10:18

## 2024-10-22 RX ADMIN — Medication 100 MCG: at 10:24

## 2024-10-22 RX ADMIN — MIDAZOLAM 2 MG: 1 INJECTION INTRAMUSCULAR; INTRAVENOUS at 09:26

## 2024-10-22 RX ADMIN — EPHEDRINE SULFATE 10 MG: 50 INJECTION, SOLUTION INTRAVENOUS at 10:05

## 2024-10-22 RX ADMIN — SODIUM CHLORIDE, SODIUM LACTATE, POTASSIUM CHLORIDE, AND CALCIUM CHLORIDE: .6; .31; .03; .02 INJECTION, SOLUTION INTRAVENOUS at 09:18

## 2024-10-22 RX ADMIN — Medication 100 MCG: at 10:06

## 2024-10-22 RX ADMIN — CEFAZOLIN SODIUM 2000 MG: 2 SOLUTION INTRAVENOUS at 09:36

## 2024-10-22 RX ADMIN — Medication 100 MCG: at 09:55

## 2024-10-22 RX ADMIN — FENTANYL CITRATE 25 MCG: 50 INJECTION INTRAMUSCULAR; INTRAVENOUS at 11:55

## 2024-10-22 RX ADMIN — Medication 100 MCG: at 10:32

## 2024-10-22 RX ADMIN — SODIUM CHLORIDE, SODIUM LACTATE, POTASSIUM CHLORIDE, AND CALCIUM CHLORIDE 125 ML/HR: .6; .31; .03; .02 INJECTION, SOLUTION INTRAVENOUS at 08:36

## 2024-10-22 RX ADMIN — Medication 100 MCG: at 10:00

## 2024-10-22 RX ADMIN — EPHEDRINE SULFATE 10 MG: 50 INJECTION, SOLUTION INTRAVENOUS at 09:58

## 2024-10-22 RX ADMIN — ACETAMINOPHEN 650 MG: 325 TABLET ORAL at 13:29

## 2024-10-22 RX ADMIN — ONDANSETRON 4 MG: 2 INJECTION INTRAMUSCULAR; INTRAVENOUS at 09:26

## 2024-10-22 NOTE — ANESTHESIA PREPROCEDURE EVALUATION
Procedure:  Placement of thoracic percutaneous spinal cord electrodes with right sided externalized extension wires for spinal cord stimulator trial (Right: Spine Thoracic)    Relevant Problems   ANESTHESIA   (-) History of anesthesia complications      CARDIO   (+) HTN (hypertension)   (-) Chest pain   (-) SMITH (dyspnea on exertion)      MUSCULOSKELETAL   (+) Chronic bilateral low back pain with bilateral sciatica   (+) Sciatica      NEURO/PSYCH   (+) Anxiety   (+) Chronic bilateral low back pain with bilateral sciatica   (+) Chronic pain syndrome      PULMONARY   (-) Shortness of breath   (-) Sleep apnea   (-) URI (upper respiratory infection)        Physical Exam    Airway    Mallampati score: II  TM Distance: <3 FB  Neck ROM: full     Dental       Cardiovascular      Pulmonary      Other Findings  post-pubertal.      Anesthesia Plan  ASA Score- 2     Anesthesia Type- general with ASA Monitors.         Additional Monitors:     Airway Plan: ETT.           Plan Factors-Exercise tolerance (METS): >4 METS.    Chart reviewed. EKG reviewed.  Existing labs reviewed. Patient summary reviewed.                  Induction- intravenous.    Postoperative Plan-         Informed Consent- Anesthetic plan and risks discussed with patient.  I personally reviewed this patient with the CRNA. Discussed and agreed on the Anesthesia Plan with the CRNA..

## 2024-10-22 NOTE — DISCHARGE INSTR - AVS FIRST PAGE
Discharge Instructions  Spinal Cord Stimulator (SCS)    Activity:  Do not lift more than 10 pounds for 6 weeks.  Avoid bending, lifting and twisting for 6 weeks.  No strenuous activities. No driving for 2 weeks.   When able to shower, continue to use clean towel and washcloth for 2 weeks post-op.  Continue to change bed linens and pajamas more frequently. Wear clean clothes daily.     Surgical incision care:  For Trial SCS placement:  Wires and stimulator will be secured with a dressing. Do NOT remove. Keep dressing DRY.   No showering with Trial in place. Sponge bath only.   Do not immerse the incisions in water for 6 weeks.  Do not apply any creams or ointments to the incision for 6 weeks, unless otherwise instructed by [facility].  Contact office if increasing redness, drainage, pain or swelling around the incisions.    Postoperative medication:  Complete course of antibiotic as directed.  [facility] will provide pain medication as coordinated with your pain specialist. All prescriptions must come from a single provider.   Take all medications as prescribed.  Call office with any questions/concerns.  Please contact office for questions regarding dosage and modifications.  No antiplatelet, anticoagulation or Nonsteroidal anti-inflammatory (NSAIDs) medication until cleared by [facility], unless otherwise instructed.   Do not operate heavy machinery or vehicles while taking sedating medications.  Use a bowel regimen while on opioids as they induce constipation. Ie. Senokot-S, Miralax, Colace, etc. Increase fiber and water intake.     For TRIALS, there will be ongoing communication with the rep and adjustments as indicated. Please contact the stimulator representative if you have any questions regarding programing.    ***Additional antibiotics have been sent to the pharmacy. After completion of your 3 day course given to you before surgery,  you should continue it for 1 week total while you have the spinal cord stimulator trial in place. Contact the office with additional questions or concerns. ***

## 2024-10-22 NOTE — ANESTHESIA POSTPROCEDURE EVALUATION
Post-Op Assessment Note    Last Filed PACU Vitals:  Vitals Value Taken Time   Temp 98.4 °F (36.9 °C) 10/22/24 1200   Pulse 75 10/22/24 1230   /65 10/22/24 1215   Resp 24 10/22/24 1229   SpO2 94 % 10/22/24 1230   Vitals shown include unfiled device data.    Modified Kaylah:  Activity: 2 (10/22/2024 12:00 PM)  Respiration: 2 (10/22/2024 12:00 PM)  Circulation: 2 (10/22/2024 12:00 PM)  Consciousness: 2 (10/22/2024 12:00 PM)  Oxygen Saturation: 1 (10/22/2024 12:00 PM)  Modified Kaylah Score: 9 (10/22/2024 12:00 PM)

## 2024-10-22 NOTE — H&P
"Neurosurgery History and Physical    Prior clinic encounter from 8/7/24 reviewed. After examining the patient I find no changes in the patients condition since the encounter.    Patient personally seen and examined.  Neurological examination unchanged compared to last office/progress note, with the following exceptions:    /56   Pulse 72   Temp 97.8 °F (36.6 °C) (Temporal)   Resp 18   Ht 5' 1\" (1.549 m)   Wt 82.6 kg (182 lb)   SpO2 94%   BMI 34.39 kg/m²      Awake and alert.  Regular cardiac rate and rhythm.  No respiratory distress.  Abdomen nontender.  Normocephalic. Extremities warm, well perfused.    Post operative instructions and medications have been reviewed with the patient.    Assessment and Plan:    All questions have been answered to the patient satisfaction.  Plan to proceed with placement of percutaneous spinal cord stimulator leads and right sided extension wires for buried trial. They are in agreement with proceeding.    "

## 2024-10-22 NOTE — ANESTHESIA POSTPROCEDURE EVALUATION
Post-Op Assessment Note    CV Status:  Stable  Pain Score: 0    Pain management: adequate    Multimodal analgesia used between 6 hours prior to anesthesia start to PACU discharge    Mental Status:  Sleepy   Hydration Status:  Stable   PONV Controlled:  None   Airway Patency:  Patent  Airway: intubated  There is a medical reason for not screening for obstructive sleep apnea and/or for not using two or more mitigation strategies   Post Op Vitals Reviewed: Yes    No anethesia notable event occurred.    Staff: CRNA           Last Filed PACU Vitals:  Vitals Value Taken Time   Temp 99.7 °F (37.6 °C) 10/22/24 1128   Pulse 72 10/22/24 1128   /62 10/22/24 1128   Resp 12 10/22/24 1128   SpO2 99 % 10/22/24 1128       Modified Kaylah:  No data recorded

## 2024-10-24 ENCOUNTER — TELEPHONE (OUTPATIENT)
Dept: NEUROSURGERY | Facility: CLINIC | Age: 79
End: 2024-10-24

## 2024-10-24 NOTE — TELEPHONE ENCOUNTER
Called patient to see how she is doing after surgery. Patient reports she is doing well overall and denies any incisional issues or fevers. Patient is able to ambulate around the house and complete ADLs. Educated the patient about the importance of preventing blood clots and provided measures how to prevent them.     Patient has not moved her bowels since the surgery. Encouraged patient to take an over the counter stool softener, if she is taking narcotic pain medication. Encouraged fiber intake and fluids.    Reviewed incision care with the patient. Advised that after three days she may take a shower and gently wash the surgical site with soap and water. Use clean wash cloth, towels, and clothing. Do not submerge in water until cleared by the surgeon. Do not apply any creams, ointments, or lotions to the site.  Patient is aware to call the office if any redness, swelling, drainage, dehiscence of incision, or fever >100 F occurs.    Patient is aware to call the office if any concerns or questions may arise. Reminded patient of her upcoming appointments with the date/time/location. Patient was appreciative for the call.

## 2024-10-24 NOTE — TELEPHONE ENCOUNTER
Received a message from FANY CHINO regarding patients complaint of drainage from her surgical site.     She describes this as yellowish brown but is not purulent in nature. She denies fevers or active drainage.     Advised that during the trial some leakage is to be expected. Explained that she should continue to reinforce the dressings as needed and do not remove and replace dresses. If the drainage is excessive and saturates through dressings or she notes it is odorous, is purulent or has a fever she should call the office for further evaluation.     She was encouraged to call with any questions or concerns.

## 2024-10-25 PROBLEM — M54.16 LUMBAR RADICULOPATHY: Status: ACTIVE | Noted: 2024-10-25

## 2024-10-25 PROCEDURE — NC001 PR NO CHARGE: Performed by: STUDENT IN AN ORGANIZED HEALTH CARE EDUCATION/TRAINING PROGRAM

## 2024-10-25 NOTE — PROGRESS NOTES
Progress Note - Neurosurgery   Name: Maria Elena Sandoval 79 y.o. female I MRN: [id number]   Date of Service: 10/25/2024 I Hospital Day: 0     Assessment & Plan  Chronic pain syndrome  As below - successful SCS trial - will plan for SCS battery placement.  Lumbar radiculopathy  As below - successful SCS trial - will plan for SCS battery placement.      Maria Elena Sandoval is a 79 year old female with a history of chronic pain and lumbar radiculopathy refractory to conservative measures who underwent a buried SCS trial lead placement on 10/22/24. She has now had her trial electrodes for 72 hours. I called her today to discuss and she endorses 100% resolution of her back pain. She is not getting as much left sided radicular relief as she is on the right, but does endorse the right sided relief to be greater than 50%. As such I would recommend a permanent SCS battery placement. We have her tentatively schedule for next Wednesday. Encouraged her to keep working with the rep for further stimulation titration over the weekend.

## 2024-10-28 ENCOUNTER — DOCUMENTATION (OUTPATIENT)
Dept: NEUROSURGERY | Facility: CLINIC | Age: 79
End: 2024-10-28

## 2024-10-29 ENCOUNTER — TELEPHONE (OUTPATIENT)
Age: 79
End: 2024-10-29

## 2024-10-29 NOTE — TELEPHONE ENCOUNTER
10/29/2024-Called pt and advised Medicare approved and office just received auth for 11/01/2024 sx.

## 2024-10-29 NOTE — PROGRESS NOTES
Mon 2:11 PM - CHAPIN Colmenares MD    The percutaneous leads are currently in place.  These are connected to an external battery pack, but they are buried in the pre-made pocket for the battery.  The reason we did it this way is because her perc leads were so challenging, I didn't want to pull them after a trial, only to have to place them again for a permanent system.  Because she is getting relief, what we will do on Wednesday is remove the externalized wires from the battery pocket, and place a battery connected to the leads she already has in place.  It has to be done in an OR because the leads are buried in that pre-made battery pocket.

## 2024-11-01 ENCOUNTER — TELEPHONE (OUTPATIENT)
Dept: NEUROSURGERY | Facility: CLINIC | Age: 79
End: 2024-11-01

## 2024-11-01 ENCOUNTER — HOSPITAL ENCOUNTER (OUTPATIENT)
Facility: HOSPITAL | Age: 79
Setting detail: OUTPATIENT SURGERY
Discharge: HOME | End: 2024-11-01
Attending: STUDENT IN AN ORGANIZED HEALTH CARE EDUCATION/TRAINING PROGRAM | Admitting: STUDENT IN AN ORGANIZED HEALTH CARE EDUCATION/TRAINING PROGRAM
Payer: MEDICARE

## 2024-11-01 ENCOUNTER — ANESTHESIA (OUTPATIENT)
Dept: PERIOP | Facility: HOSPITAL | Age: 79
End: 2024-11-01
Payer: MEDICARE

## 2024-11-01 ENCOUNTER — ANESTHESIA EVENT (OUTPATIENT)
Dept: PERIOP | Facility: HOSPITAL | Age: 79
End: 2024-11-01
Payer: MEDICARE

## 2024-11-01 VITALS
WEIGHT: 182.1 LBS | HEART RATE: 63 BPM | SYSTOLIC BLOOD PRESSURE: 152 MMHG | OXYGEN SATURATION: 94 % | DIASTOLIC BLOOD PRESSURE: 66 MMHG | BODY MASS INDEX: 34.38 KG/M2 | RESPIRATION RATE: 15 BRPM | TEMPERATURE: 97.4 F | HEIGHT: 61 IN

## 2024-11-01 DIAGNOSIS — Z98.890 POST-OPERATIVE STATE: ICD-10-CM

## 2024-11-01 DIAGNOSIS — M54.41 CHRONIC BILATERAL LOW BACK PAIN WITH BILATERAL SCIATICA: Primary | ICD-10-CM

## 2024-11-01 DIAGNOSIS — G89.29 CHRONIC BILATERAL LOW BACK PAIN WITH BILATERAL SCIATICA: Primary | ICD-10-CM

## 2024-11-01 DIAGNOSIS — M54.42 CHRONIC BILATERAL LOW BACK PAIN WITH BILATERAL SCIATICA: Primary | ICD-10-CM

## 2024-11-01 DIAGNOSIS — G89.4 CHRONIC PAIN SYNDROME: ICD-10-CM

## 2024-11-01 LAB — GLUCOSE SERPL-MCNC: 143 MG/DL (ref 65–140)

## 2024-11-01 PROCEDURE — C1787 PATIENT PROGR, NEUROSTIM: HCPCS | Performed by: STUDENT IN AN ORGANIZED HEALTH CARE EDUCATION/TRAINING PROGRAM

## 2024-11-01 PROCEDURE — 63685 INS/RPLC SPI NPG/RCVR POCKET: CPT | Performed by: STUDENT IN AN ORGANIZED HEALTH CARE EDUCATION/TRAINING PROGRAM

## 2024-11-01 PROCEDURE — 82948 REAGENT STRIP/BLOOD GLUCOSE: CPT

## 2024-11-01 PROCEDURE — 99024 POSTOP FOLLOW-UP VISIT: CPT | Performed by: STUDENT IN AN ORGANIZED HEALTH CARE EDUCATION/TRAINING PROGRAM

## 2024-11-01 PROCEDURE — NC001 PR NO CHARGE: Performed by: STUDENT IN AN ORGANIZED HEALTH CARE EDUCATION/TRAINING PROGRAM

## 2024-11-01 DEVICE — ENVELOPE TYRX NEURO ABS ANBCTRL MED: Type: IMPLANTABLE DEVICE | Site: BUTTOCKS | Status: FUNCTIONAL

## 2024-11-01 RX ORDER — HYDROMORPHONE HCL IN WATER/PF 6 MG/30 ML
0.2 PATIENT CONTROLLED ANALGESIA SYRINGE INTRAVENOUS
Status: DISCONTINUED | OUTPATIENT
Start: 2024-11-01 | End: 2024-11-01 | Stop reason: HOSPADM

## 2024-11-01 RX ORDER — DEXAMETHASONE SODIUM PHOSPHATE 10 MG/ML
INJECTION, SOLUTION INTRAMUSCULAR; INTRAVENOUS AS NEEDED
Status: DISCONTINUED | OUTPATIENT
Start: 2024-11-01 | End: 2024-11-01

## 2024-11-01 RX ORDER — CEFAZOLIN SODIUM 2 G/50ML
2000 SOLUTION INTRAVENOUS ONCE
Status: COMPLETED | OUTPATIENT
Start: 2024-11-01 | End: 2024-11-01

## 2024-11-01 RX ORDER — VANCOMYCIN HYDROCHLORIDE 1 G/20ML
INJECTION, POWDER, LYOPHILIZED, FOR SOLUTION INTRAVENOUS AS NEEDED
Status: DISCONTINUED | OUTPATIENT
Start: 2024-11-01 | End: 2024-11-01 | Stop reason: HOSPADM

## 2024-11-01 RX ORDER — LIDOCAINE HYDROCHLORIDE AND EPINEPHRINE 10; 10 MG/ML; UG/ML
INJECTION, SOLUTION INFILTRATION; PERINEURAL AS NEEDED
Status: DISCONTINUED | OUTPATIENT
Start: 2024-11-01 | End: 2024-11-01 | Stop reason: HOSPADM

## 2024-11-01 RX ORDER — FENTANYL CITRATE 50 UG/ML
INJECTION, SOLUTION INTRAMUSCULAR; INTRAVENOUS AS NEEDED
Status: DISCONTINUED | OUTPATIENT
Start: 2024-11-01 | End: 2024-11-01

## 2024-11-01 RX ORDER — ROCURONIUM BROMIDE 10 MG/ML
INJECTION, SOLUTION INTRAVENOUS AS NEEDED
Status: DISCONTINUED | OUTPATIENT
Start: 2024-11-01 | End: 2024-11-01

## 2024-11-01 RX ORDER — SODIUM CHLORIDE, SODIUM LACTATE, POTASSIUM CHLORIDE, CALCIUM CHLORIDE 600; 310; 30; 20 MG/100ML; MG/100ML; MG/100ML; MG/100ML
INJECTION, SOLUTION INTRAVENOUS CONTINUOUS PRN
Status: DISCONTINUED | OUTPATIENT
Start: 2024-11-01 | End: 2024-11-01

## 2024-11-01 RX ORDER — LIDOCAINE HYDROCHLORIDE 10 MG/ML
INJECTION, SOLUTION EPIDURAL; INFILTRATION; INTRACAUDAL; PERINEURAL AS NEEDED
Status: DISCONTINUED | OUTPATIENT
Start: 2024-11-01 | End: 2024-11-01

## 2024-11-01 RX ORDER — OXYCODONE HYDROCHLORIDE 5 MG/1
10 TABLET ORAL EVERY 4 HOURS PRN
Status: DISCONTINUED | OUTPATIENT
Start: 2024-11-01 | End: 2024-11-01 | Stop reason: HOSPADM

## 2024-11-01 RX ORDER — OXYCODONE HYDROCHLORIDE 5 MG/1
5 TABLET ORAL EVERY 4 HOURS PRN
Status: DISCONTINUED | OUTPATIENT
Start: 2024-11-01 | End: 2024-11-01 | Stop reason: HOSPADM

## 2024-11-01 RX ORDER — SODIUM CHLORIDE, SODIUM LACTATE, POTASSIUM CHLORIDE, CALCIUM CHLORIDE 600; 310; 30; 20 MG/100ML; MG/100ML; MG/100ML; MG/100ML
50 INJECTION, SOLUTION INTRAVENOUS CONTINUOUS
Status: DISCONTINUED | OUTPATIENT
Start: 2024-11-01 | End: 2024-11-01 | Stop reason: HOSPADM

## 2024-11-01 RX ORDER — ONDANSETRON 2 MG/ML
4 INJECTION INTRAMUSCULAR; INTRAVENOUS ONCE AS NEEDED
Status: DISCONTINUED | OUTPATIENT
Start: 2024-11-01 | End: 2024-11-01 | Stop reason: HOSPADM

## 2024-11-01 RX ORDER — PROPOFOL 10 MG/ML
INJECTION, EMULSION INTRAVENOUS AS NEEDED
Status: DISCONTINUED | OUTPATIENT
Start: 2024-11-01 | End: 2024-11-01

## 2024-11-01 RX ORDER — CHLORHEXIDINE GLUCONATE ORAL RINSE 1.2 MG/ML
15 SOLUTION DENTAL ONCE
Status: COMPLETED | OUTPATIENT
Start: 2024-11-01 | End: 2024-11-01

## 2024-11-01 RX ORDER — CEPHALEXIN 500 MG/1
500 CAPSULE ORAL EVERY 6 HOURS SCHEDULED
Qty: 40 CAPSULE | Refills: 0 | Status: SHIPPED | OUTPATIENT
Start: 2024-11-01 | End: 2024-11-11

## 2024-11-01 RX ORDER — ONDANSETRON 2 MG/ML
4 INJECTION INTRAMUSCULAR; INTRAVENOUS EVERY 4 HOURS PRN
Status: DISCONTINUED | OUTPATIENT
Start: 2024-11-01 | End: 2024-11-01 | Stop reason: HOSPADM

## 2024-11-01 RX ORDER — ACETAMINOPHEN 325 MG/1
975 TABLET ORAL EVERY 8 HOURS PRN
Status: DISCONTINUED | OUTPATIENT
Start: 2024-11-01 | End: 2024-11-01 | Stop reason: HOSPADM

## 2024-11-01 RX ORDER — OXYCODONE AND ACETAMINOPHEN 5; 325 MG/1; MG/1
1 TABLET ORAL EVERY 6 HOURS PRN
Qty: 20 TABLET | Refills: 0 | Status: SHIPPED | OUTPATIENT
Start: 2024-11-01 | End: 2024-11-11

## 2024-11-01 RX ORDER — ONDANSETRON 2 MG/ML
INJECTION INTRAMUSCULAR; INTRAVENOUS AS NEEDED
Status: DISCONTINUED | OUTPATIENT
Start: 2024-11-01 | End: 2024-11-01

## 2024-11-01 RX ORDER — FENTANYL CITRATE/PF 50 MCG/ML
25 SYRINGE (ML) INJECTION
Status: DISCONTINUED | OUTPATIENT
Start: 2024-11-01 | End: 2024-11-01 | Stop reason: HOSPADM

## 2024-11-01 RX ADMIN — PHENYLEPHRINE HYDROCHLORIDE 100 MCG/MIN: 50 INJECTION INTRAVENOUS at 07:41

## 2024-11-01 RX ADMIN — CHLORHEXIDINE GLUCONATE 15 ML: 1.2 RINSE ORAL at 06:51

## 2024-11-01 RX ADMIN — PROPOFOL 130 MG: 10 INJECTION, EMULSION INTRAVENOUS at 07:35

## 2024-11-01 RX ADMIN — FENTANYL CITRATE 50 MCG: 50 INJECTION INTRAMUSCULAR; INTRAVENOUS at 07:35

## 2024-11-01 RX ADMIN — CEFAZOLIN SODIUM 2000 MG: 2 SOLUTION INTRAVENOUS at 07:30

## 2024-11-01 RX ADMIN — ROCURONIUM BROMIDE 40 MG: 10 INJECTION INTRAVENOUS at 07:35

## 2024-11-01 RX ADMIN — LIDOCAINE HYDROCHLORIDE 50 MG: 10 INJECTION, SOLUTION EPIDURAL; INFILTRATION; INTRACAUDAL at 07:35

## 2024-11-01 RX ADMIN — DEXAMETHASONE SODIUM PHOSPHATE 10 MG: 10 INJECTION, SOLUTION INTRAMUSCULAR; INTRAVENOUS at 08:01

## 2024-11-01 RX ADMIN — SODIUM CHLORIDE, SODIUM LACTATE, POTASSIUM CHLORIDE, AND CALCIUM CHLORIDE: .6; .31; .03; .02 INJECTION, SOLUTION INTRAVENOUS at 07:14

## 2024-11-01 RX ADMIN — ONDANSETRON 4 MG: 2 INJECTION INTRAMUSCULAR; INTRAVENOUS at 08:10

## 2024-11-01 NOTE — TELEPHONE ENCOUNTER
11/1/24: SURGERY    2WK PORICHARD W/ NURSE  11/14/24 / 2:30 / LESLIE    6WK POV W/ KYA  12/18/24 / 3:00 / LESLIE

## 2024-11-01 NOTE — ANESTHESIA POSTPROCEDURE EVALUATION
Post-Op Assessment Note    CV Status:  Stable  Pain Score: 0    Pain management: adequate       Mental Status:  Awake and sleepy   Hydration Status:  Euvolemic   PONV Controlled:  Controlled   Airway Patency:  Patent     Post Op Vitals Reviewed: Yes    No anethesia notable event occurred.    Staff: CRNA           Last Filed PACU Vitals:  Vitals Value Taken Time   Temp 97.4 °F (36.3 °C) 11/01/24 0829   Pulse 62 11/01/24 0829   /67 11/01/24 0828   Resp 18 11/01/24 0829   SpO2 96 % 11/01/24 0829   Vitals shown include unfiled device data.    Modified Kaylah:  No data recorded

## 2024-11-01 NOTE — ANESTHESIA PREPROCEDURE EVALUATION
Procedure:  Placement of right sided spinal cord stimulator battery, removal of extension wires (Right: Buttocks)    Relevant Problems   CARDIO   (+) HTN (hypertension)      MUSCULOSKELETAL   (+) Chronic bilateral low back pain with bilateral sciatica   (+) Sciatica      NEURO/PSYCH   (+) Anxiety   (+) Chronic bilateral low back pain with bilateral sciatica   (+) Chronic pain syndrome        Physical Exam    Airway    Mallampati score: I  TM Distance: >3 FB  Neck ROM: full     Dental       Cardiovascular  Cardiovascular exam normal    Pulmonary  Pulmonary exam normal     Other Findings  post-pubertal.      Anesthesia Plan  ASA Score- 2     Anesthesia Type- general with ASA Monitors.         Additional Monitors:     Airway Plan: ETT.           Plan Factors-Exercise tolerance (METS): >4 METS.    Chart reviewed. EKG reviewed. Imaging results reviewed. Existing labs reviewed. Patient summary reviewed.    Patient is not a current smoker.  Patient did not smoke on day of surgery.    Obstructive sleep apnea risk education given perioperatively.        Induction- intravenous.    Postoperative Plan- Plan for postoperative opioid use. Planned trial extubation        Informed Consent- Anesthetic plan and risks discussed with patient.  I personally reviewed this patient with the CRNA. Discussed and agreed on the Anesthesia Plan with the CRNA..         no

## 2024-11-01 NOTE — OP NOTE
OPERATIVE REPORT  PATIENT NAME: Maria Elena Sandoval    :  1945  MRN: [id number]  Pt Location: AN OR ROOM 04    SURGERY DATE: 2024    Surgeons and Role:     * Karlos Colmenares MD - Primary    Preop Diagnosis:  Chronic pain syndrome [G89.4]  Lumbar radiculopathy [M54.16]    * No Diagnosis Codes entered *    Procedure(s):  Right - Placement of right sided spinal cord stimulator battery. removal of extension wires    Specimen(s):  * No specimens in log *    Estimated Blood Loss:   Minimal    Drains:  * No LDAs found *    Anesthesia Type:   General    Operative Indications:  Chronic pain syndrome [G89.4]  Lumbar radiculopathy [M54.16]    Operative Findings:  Battery placed without issue and connected to previously implanted percutaneous leads. Externalized extension wires removed. System interrogated at conclusion of case and functioning appropriately.     Complications:   None    Procedure and Technique:  The patient was brought to the OR and placed under general anesthesia. She was positioned prone on a Benito table. The prior right flank incision was prepped and draped. Prior amna this the suture securing the externalized extension wires was cut. The was prepped out of the sterile field. The flank incision was opened and dissection carried down to the leads and the proximal extension wires. The leads were disconnected from the extension wires. The tips of the extension wires were cut and with gentle traction from off the field they were pulled from the stab site were they were externalized. The battery was then brought on to the field and connected to the leads. The system was interrogated and was functioning appropriately. The site was irrigated and vancomycin powder was placed in the wound. The wound was closed with interrupted 2-0 vicryls, and monocryl and skin glue for skin. The drapes were taken down and she was flipped supine. She was extubated and transferred to the PACU in stable condition.     Patient  Disposition:  PACU     Implant Name Type Inv. Item Serial No.  Lot No. LRB No. Used Action   ENVELOPE TYRX NEURO ABS ANBCTRL MED - [id number]  [employer]OPE [employer] NEURO ABS ANBCTRL MED  [employer] [id number] 1 Implanted        SIGNATURE: Karlos Colmenares MD  DATE: November 1, 2024  TIME: 6:30 AM

## 2024-11-01 NOTE — ANESTHESIA POSTPROCEDURE EVALUATION
Post-Op Assessment Note            No anethesia notable event occurred.            Last Filed PACU Vitals:  Vitals Value Taken Time   Temp 97.3 °F (36.3 °C) 11/01/24 0857   Pulse 69 11/01/24 0857   /58 11/01/24 0857   Resp 16 11/01/24 0857   SpO2 93 % 11/01/24 0857       Modified Kaylah:  Activity: 2 (11/1/2024  9:50 AM)  Respiration: 2 (11/1/2024  9:50 AM)  Circulation: 2 (11/1/2024  9:50 AM)  Consciousness: 2 (11/1/2024  9:50 AM)  Oxygen Saturation: 2 (11/1/2024  9:50 AM)  Modified Kaylah Score: 10 (11/1/2024  9:50 AM)

## 2024-11-01 NOTE — DISCHARGE INSTR - AVS FIRST PAGE
Neurosurgery Discharge Instructions  Spinal Cord Stimulator (SCS)    Activity:  Do not lift more than 10 pounds for 6 weeks.  Avoid bending, lifting and twisting for 6 weeks.  No strenuous activities. No driving for 2 weeks.   When able to shower, continue to use clean towel and washcloth for 2 weeks post-op.  Continue to change bed linens and pajamas more frequently. Wear clean clothes daily.     Surgical incision care:  For Permanent SCS placement:  Keep dressings in place for 3 days.  Keep incisions dry for 3 days.  Shower with mild antimicrobial soap after 3 days.   After 3 days, incisions may be left open to air, but must remain clean.  Do not immerse the incisions in water for 6 weeks.  Do not apply any creams or ointments to the incision for 6 weeks, unless otherwise instructed by [facility].  Contact office if increasing redness, drainage, pain or swelling around the incisions.    Postoperative medication:  Complete course of antibiotic as directed.  [facility] will provide pain medication as coordinated with your pain specialist. All prescriptions must come from a single provider.   Take all medications as prescribed.  Call office with any questions/concerns.  Please contact office for questions regarding dosage and modifications.  No antiplatelet, anticoagulation or Nonsteroidal anti-inflammatory (NSAIDs) medication until cleared by [facility], unless otherwise instructed.   Do not operate heavy machinery or vehicles while taking sedating medications.  Use a bowel regimen while on opioids as they induce constipation. Ie. Senokot-S, Miralax, Colace, etc. Increase fiber and water intake.     ***Note that it may take some time for the stimulator to improve chronic pain symptoms. Adjustment of the stimulator program can begin 2 weeks after placement. ***    ***PLEASE CONTACT YOUR REP PRIOR TO YOUR 2 WEEK POSTOPERATIVE VISIT TO PROVIDE  THEM WITH THE DATE AND TIME OF YOUR APPOINTMENT***

## 2024-11-01 NOTE — H&P
Neurosurgery History and Physical    Prior clinic encounter from 8/7/24 reviewed. After examining the patient I find no changes in the patients condition since the encounter.    Patient personally seen and examined.  Neurological examination unchanged compared to last office/progress note, with the following exceptions:    There were no vitals taken for this visit.     Awake and alert.  Regular cardiac rate and rhythm.  No respiratory distress.  Abdomen nontender.  Normocephalic. Extremities warm, well perfused.    Post operative instructions and medications have been reviewed with the patient.    Assessment and Plan:    All questions have been answered to the patient satisfaction.  Plan to proceed with placement of right sided spinal cord stimulator battery, removal of extension wires They are in agreement with proceeding.

## 2024-11-05 ENCOUNTER — TELEPHONE (OUTPATIENT)
Age: 79
End: 2024-11-05

## 2024-11-05 NOTE — TELEPHONE ENCOUNTER
Call back received.     Patient reports she is doing well overall and denies any incisional issues or fevers. Patient is able to ambulate around the house and complete ADLs. Educated the patient about the importance of preventing blood clots and provided measures how to prevent them.     Patient has moved her bowels since the surgery. Encouraged patient to take an over the counter stool softener, if she is taking narcotic pain medication. Encouraged fiber intake and fluids.    Reviewed incision care with the patient. Advised that after three days she may take a shower and gently wash the surgical site with soap and water. Use clean wash cloth, towels, and clothing. Do not submerge in water until cleared by the surgeon. Do not apply any creams, ointments, or lotions to the site.  Patient is aware to call the office if any redness, swelling, drainage, dehiscence of incision, or fever >100 F occurs.    Patient is aware to call the office if any concerns or questions may arise. Reminded patient of her upcoming appointments with the date/time/location. Patient was appreciative for the call.

## 2024-11-05 NOTE — TELEPHONE ENCOUNTER
1st attempt - Called Maria Elena Sandoval on primary contact number after surgery  11/1/2024  to check in on recovery and provide post surgical instructions. Got voicemail, left message to callback. Will make another attempt if no callback is received.

## 2024-11-07 ENCOUNTER — TELEPHONE (OUTPATIENT)
Age: 79
End: 2024-11-07

## 2024-11-15 ENCOUNTER — CLINICAL SUPPORT (OUTPATIENT)
Dept: NEUROSURGERY | Facility: CLINIC | Age: 79
End: 2024-11-15

## 2024-11-15 VITALS — TEMPERATURE: 98.1 F | SYSTOLIC BLOOD PRESSURE: 132 MMHG | DIASTOLIC BLOOD PRESSURE: 64 MMHG

## 2024-11-15 DIAGNOSIS — Z96.89 S/P INSERTION OF SPINAL CORD STIMULATOR: Primary | ICD-10-CM

## 2024-11-15 DIAGNOSIS — Z96.89 SPINAL CORD STIMULATOR STATUS: ICD-10-CM

## 2024-11-15 DIAGNOSIS — Z98.890 POST-OPERATIVE STATE: ICD-10-CM

## 2024-11-15 PROCEDURE — 99024 POSTOP FOLLOW-UP VISIT: CPT | Performed by: STUDENT IN AN ORGANIZED HEALTH CARE EDUCATION/TRAINING PROGRAM

## 2024-11-15 NOTE — PROGRESS NOTES
Post-Op Visit- Neurosurgery    Maria Elena Sandoval 79 y.o. female MRN: [id number]    Chief Complaint:  Patient presents post: Placement of right sided spinal cord stimulator battery, removal of extension wires - Right    History of Present Illness:  Patient presents for 2 week POV for incision check.    Maria Elena arrived accompanied by her     Assessment:   Vitals:    11/15/24 1030   BP: 132/64   Temp: 98.1 °F (36.7 °C)       Wound Exam:             Procedure:  Surgical site assessment.   Complications: None.       Discussion/Summary:  Reviewed incision care with patient including daily observation for s/s infection including: increased erythema, edema, drainage, dehiscence of incision or fever >101.  Should these be observed, she understands that she is to call and/or return immediately for reassessment.  Advised patient to continue cleansing area with mild soap and water and pat dry. Not to apply any lotions, creams, or ointments, & not to submerge in any water for 4 more weeks.     She is to maintain activity restrictions until cleared by the surgeon. Activity levels were also reviewed with the patient in detail, she is to lift no greater than 10 pounds and ambulation is encouraged as tolerated.     Verified date/time/location of upcoming POV and reminded her to complete x-rays prior to her next appointment. She is to call the office with any further questions or concerns, or if any incisional issues or fevers would arise.     Maria Elena met with Lupe from [employer] for programming at the conclusion of our visit.

## 2024-11-15 NOTE — PATIENT INSTRUCTIONS
1. Wash incision gently in the shower. Avoid submerging in tub, hot tub, or pool.  2. Leave incision open to air when ever possible, may cover loosely with gauze and paper tape for comfort. Do not seal incision under bandages.    3. Do not apply any creams, ointments, or lotions directly to incision.   4. Maintain activity restrictions of lifting, pushing, pulling no more than 5-10 pounds.   5. Ambulate as tolerated.   6. Return for follow-up visit on   7. Complete any necessary imaging 5-7 days prior to upcoming appointment. X-Ray can be completed as a walk in and does not require and appointment.   8. Contact the office with any questions or concerns.

## 2024-12-16 ENCOUNTER — HOSPITAL ENCOUNTER (OUTPATIENT)
Dept: RADIOLOGY | Facility: HOSPITAL | Age: 79
Discharge: HOME | End: 2024-12-16
Payer: MEDICARE

## 2024-12-16 DIAGNOSIS — Z96.89 S/P INSERTION OF SPINAL CORD STIMULATOR: ICD-10-CM

## 2024-12-16 DIAGNOSIS — Z96.89 SPINAL CORD STIMULATOR STATUS: ICD-10-CM

## 2024-12-16 DIAGNOSIS — Z98.890 POST-OPERATIVE STATE: ICD-10-CM

## 2024-12-16 PROCEDURE — 72072 X-RAY EXAM THORAC SPINE 3VWS: CPT

## 2024-12-18 ENCOUNTER — TELEPHONE (OUTPATIENT)
Dept: NEUROSURGERY | Facility: CLINIC | Age: 79
End: 2024-12-18

## 2024-12-18 ENCOUNTER — OFFICE VISIT (OUTPATIENT)
Dept: NEUROSURGERY | Facility: CLINIC | Age: 79
End: 2024-12-18
Payer: MEDICARE

## 2024-12-18 VITALS — WEIGHT: 182 LBS | TEMPERATURE: 98.2 F | BODY MASS INDEX: 34.36 KG/M2 | HEIGHT: 61 IN

## 2024-12-18 DIAGNOSIS — M54.16 LUMBAR RADICULOPATHY: ICD-10-CM

## 2024-12-18 DIAGNOSIS — Z96.89 S/P INSERTION OF SPINAL CORD STIMULATOR: Primary | ICD-10-CM

## 2024-12-18 PROCEDURE — 99212 OFFICE O/P EST SF 10 MIN: CPT | Performed by: STUDENT IN AN ORGANIZED HEALTH CARE EDUCATION/TRAINING PROGRAM

## 2024-12-18 NOTE — PROGRESS NOTES
Name: Maria Elena Sandoval      : 1945      MRN: [id number]  Encounter Provider: Karlos Colmenares MD  Encounter Date: 2024   Encounter department: [facility] NEUROSURGICAL [facility]  :  Assessment & Plan  S/P insertion of spinal cord stimulator  Maria Elena Sandoval is a 79 year old female with a history of chronic back and radicular leg pain now approximately 6 weeks s/p SCS implantation ([employer]). Overall she reports continued great efficacy for her low back and left leg. She is still having some radicular right sided symptoms. I did review her XR T spine which show the leads in stable position. Unfortunately she is only at about 10% charge today and so we are unable to do reprogramming with the rep, but will plan for her to meet with the rep in the coming days to see if we can better capture the right sided pain. Otherwise, going forward I'll continue to be available on an as needed basis for any issues related to her SCS.   Orders:    Ambulatory Referral to Physical Therapy; Future        History of Present Illness   HPI  Maria Elena Sandoval is a 79 year old female with a history of chronic back and radicular leg pain now approximately 6 weeks s/p SCS implantation ([employer]tr[employer]). See assessment and plan.  Review of Systems   Genitourinary:         Incontinence   Musculoskeletal:  Positive for back pain (left side of waist, down leg) and gait problem (ambulting with rolling walker).   Skin: Negative.    Neurological:  Positive for numbness (left leg, neuropathy).   Psychiatric/Behavioral: Negative.      I have personally reviewed the MA's review of systems and made changes as necessary.         Objective   There were no vitals taken for this visit.    Physical Exam  Neurological Exam  Awake and alert  Oriented and appropriate  Grossly 5/5 throughout

## 2024-12-18 NOTE — TELEPHONE ENCOUNTER
Patient checked out with Physical Therapy and Prn to Neurosurgery message sent to nurse asking if a nurse can please change the order that Dr. Colmenares placed for physical Therapy to [facility] as per patient's request.

## 2025-03-26 NOTE — PROGRESS NOTES
UROLOGY FOLLOW-UP ENCOUNTER    Maria Elena Sandoval is a 80 y.o. female with incontinence    Pertinent non-urologic PMH: HTN, GERD, fibromyalgia, sciatic    Pertinent non-urologic PSH: Spinal fusion, had colon resection in  for diverticulitis with colostomy creation (patient opted not to have colostomy reversed to avoid major surgery)    Denied GYN surgery    Anticoagulation: None    : 0    Vaginal deliveries: 2    Since first spinal surgery around , started to have leakage, progressively worse. Up to 5-6 pads over 24 hours. Does leak overnight. Mixed incontinence UUI>TERESA. Does not always that she is leaking or that she has to go to bathroom.    She saw a urologist in [place] around  and failed oral therapy for OAB    No blockage of colostomy. Drinks 3 bottles per day. 1 cup decaf coffee per day. Drinks 2 glasses of diet green ice tea. Minimal spicy foods. Min fluids after dinner.    Pelvic exam with female chaperone 3/28/2025:  Positive cough stress test  Stage II-III cystocele  Stage II apical excursion  Stage I-II rectocele  Negative tender points    Urine dip negative on 3/28/2025    PVR 44 cc on 3/28/2025    Assessment and plan:     Mixed urinary incontinence    Patient with history as detailed above.  She has had spinal surgery before, but no CVA and no spinal cord injury.  She states that since her spinal surgery on , she noted progressive leakage.  At this point she goes through 5-6 pads per day.  She does have some leakage overnight.    She admitted to symptoms consistent with both urge incontinence and stress incontinence.  She does not always have the sense of urgency when she does leak.    She stated that she had previously seen a urologist a few years ago and had failed medical therapy for overactive bladder.    Of note, she does have minimal risk factors for overactive bladder in terms of her fluid intake and diet.  She does have a colostomy, but does not have any issues with the colostomy  draining properly.  No concern for constipation.    On her pelvic exam today, she did have some components of vaginal prolapse and she did also have positive cough stress test.    Overall, I did talk to her about mixed urinary incontinence.  I went over urge incontinence versus stress incontinence and the causes of each of the potential solutions for each.    Overall, I explained that for patient such as herself that have complicated symptoms, and there is not a clear-cut larger cause of her incontinence in terms of stress versus urge, it is often helpful to get some hard clinical data to help make the decision in terms of the next best step.    And therefore talked to her about urodynamic testing.    We went over the steps of the procedure.  Went over the risks and benefits of the testing.    I explained that testing would give us a better idea as to which incontinence component, whether it be urge or stress, was more at fault in terms of her leakage symptoms.  I explained that if we were to get a better idea of which because of incontinence was more at fault, we would be able to treat that because of incontinence first.    Patient is overall frustrated by her incontinence and wants a solution soon if possible, but she does understand that she has a complex history and we do need to get some more data gathering history before deciding the next appropriate treatment plan.    She will pursue urodynamic testing and I will see her a few weeks later to review results.    I explained to her that on her pelvic exam she did have some signs of vaginal prolapse.  She was not bothered by the prolapse.  I did explain that she did have signs of stress incontinence with positive cough stress test on the physical exam today.    I also reviewed with her that her urine dip appeared to be negative for infection.    I also explained to her that her PVR was 44 cc, indicating that she empties her bladder fairly  "well.          PLAN  -Will have patient obtain urodynamic testing.  I will see her a few weeks later and review results.  We will then decide what the next course of treatment will be for her incontinence.        Patient's daughter, Judith, present in office today and assisted with history      Portions of the above record have been created with voice recognition software.  Occasional wrong word or \"sound alike\" substitution may have occurred due to the inherent limitations of voice recognition software.  Read the chart carefully and recognize, using context, where substitution may have occurred.      Matt Durán DO        Chief Complaint     Mixed urinary incontinence    History of Present Illness     See summary above    No fevers or chills          The following portions of the patient's history were reviewed and updated as appropriate: allergies, current medications, past family history, past medical history, past social history, past surgical history and problem list.            Review of Systems     Review of Systems   Constitutional:  Negative for chills and fever.   Respiratory:  Negative for cough and shortness of breath.    Genitourinary:  Negative for dysuria and hematuria.   Neurological:  Negative for dizziness and headaches.   Psychiatric/Behavioral:  Negative for agitation and behavioral problems.        Allergies     No Known Allergies    Physical Exam     Physical Exam  Constitutional:       General: She is not in acute distress.  HENT:      Head: Normocephalic and atraumatic.   Pulmonary:      Effort: Pulmonary effort is normal. No respiratory distress.   Abdominal:      General: Abdomen is flat.      Palpations: Abdomen is soft.      Tenderness: There is no right CVA tenderness or left CVA tenderness.   Genitourinary:     Comments: Pelvic exam with female chaperone 3/28/2025:  Positive cough stress test  Stage II-III cystocele  Stage II apical excursion  Stage I-II rectocele  Negative " "tender points  Skin:     General: Skin is warm and dry.   Neurological:      General: No focal deficit present.      Mental Status: She is alert and oriented to person, place, and time.   Psychiatric:         Mood and Affect: Mood normal.         Behavior: Behavior normal.             Vital Signs  Vitals:    03/28/25 1304   BP: 114/70   BP Location: Left arm   Patient Position: Sitting   Cuff Size: Adult   Pulse: 80   SpO2: 96%   Weight: 80.3 kg (177 lb)   Height: 5' 1\" (1.549 m)         Current Medications       Current Outpatient Medications:     candesartan (ATACAND) 32 MG tablet, Take 32 mg by mouth daily, Disp: , Rfl:     carvedilol (COREG) 12.5 mg tablet, Take 12.5 mg by mouth 2 (two) times a day with meals, Disp: , Rfl:     irbesartan (AVAPRO) 150 mg tablet, , Disp: , Rfl:     Multiple Vitamin (multivitamin) tablet, Take 1 tablet by mouth daily, Disp: , Rfl:     pantoprazole (PROTONIX) 40 mg tablet, , Disp: , Rfl:     prednisoLONE 5 MG (21) TBPK, , Disp: , Rfl:     venlafaxine (EFFEXOR-XR) 75 mg 24 hr capsule, Take 75 mg by mouth daily, Disp: , Rfl:     zolpidem (AMBIEN) 5 mg tablet, , Disp: , Rfl:     Active Problems     Patient Active Problem List   Diagnosis    Chronic pain syndrome    Chronic bilateral low back pain with bilateral sciatica    Sacroiliitis (HCC)    Sciatica    Compression fracture of T5 vertebra (HCC)    Thoracic disc herniation    HTN (hypertension)    Anxiety    Lumbar radiculopathy       Past Medical History     Past Medical History:   Diagnosis Date    Arthritis     Fibromyalgia, primary     GERD (gastroesophageal reflux disease)     Hypertension     Sciatica        Surgical History     Past Surgical History:   Procedure Laterality Date    COLON SURGERY      colostomy    EPIDURAL BLOCK INJECTION N/A 08/25/2023    Procedure: L5-S1 LUMBAR epidural steroid injection (37369);  Surgeon: Hector Mora DO;  Location[facility] MAIN OR;  Service: Pain Management     [place] INJECT SI JOINT " "ARTHRGRPHY&/ANES/STEROID W/PRIMO Bilateral 06/09/2023    Procedure: SACROILIAC joint injection (72254 );  Surgeon: Endy Christopher MD;  Location: [facility] MAIN OR;  Service: Pain Management     TN INSJ/RPLCMT SPINAL NPG/RCVR POCKET CRTJ&CONNJ Right 11/1/2024    Procedure: Placement of right sided spinal cord stimulator battery, removal of extension wires;  Surgeon: Karlos Colmenares MD;  Location: [facility] Main OR;  Service: Neurosurgery    TN PRQ IMPLTJ NSTIM ELECTRODE ARRAY EPIDURAL Bilateral 07/10/2024    Procedure: MEDTR[employer] SCS TRIAL;  Surgeon: Hector Mora DO;  Location: [facility] MAIN OR;  Service: Pain Management     TN PRQ IMPLTJ NSTIM ELECTRODE ARRAY EPIDURAL Right 10/22/2024    Procedure: Placement of thoracic percutaneous spinal cord electrodes with right sided externalized extension wires for spinal cord stimulator trial;  Surgeon: Karlos Colmenares MD;  Location: [facility] MAIN OR;  Service: Neurosurgery    SPINAL FUSION      SPINE SURGERY      TOTAL KNEE ARTHROPLASTY Left 2010         Family History     Family History   Problem Relation Age of Onset    No Known Problems Mother     No Known Problems Father        Social History     Social History     Social History     Tobacco Use   Smoking Status Former    Types: Cigarettes   Smokeless Tobacco Never       Pertinent Lab Values     Lab Results   Component Value Date    CREATININE 0.75 10/08/2024       No results found for: \"PSA\"    Pertinent Imaging     N/A    Pertinent Pathology     N/A      I have spent a total time of 40 minutes in caring for this patient on the day of the visit/encounter including Diagnostic results, Prognosis, Risks and benefits of tx options, Instructions for management, Patient and family education, Importance of tx compliance, Impressions, Counseling / Coordination of care, Documenting in the medical record, Reviewing/placing orders in the medical record (including tests, medications, and/or procedures), and Obtaining or reviewing history  " .

## 2025-03-28 ENCOUNTER — PROCEDURE VISIT (OUTPATIENT)
Dept: UROLOGY | Facility: CLINIC | Age: 80
End: 2025-03-28
Payer: MEDICARE

## 2025-03-28 ENCOUNTER — TELEPHONE (OUTPATIENT)
Dept: UROLOGY | Facility: CLINIC | Age: 80
End: 2025-03-28

## 2025-03-28 VITALS
HEIGHT: 61 IN | BODY MASS INDEX: 33.42 KG/M2 | WEIGHT: 177 LBS | DIASTOLIC BLOOD PRESSURE: 70 MMHG | SYSTOLIC BLOOD PRESSURE: 114 MMHG | HEART RATE: 80 BPM | OXYGEN SATURATION: 96 %

## 2025-03-28 DIAGNOSIS — N39.46 MIXED INCONTINENCE URGE AND STRESS: Primary | ICD-10-CM

## 2025-03-28 LAB
POST-VOID RESIDUAL VOLUME, ML POC: 44 ML
SL AMB  POCT GLUCOSE, UA: NORMAL
SL AMB LEUKOCYTE ESTERASE,UA: NORMAL
SL AMB POCT BILIRUBIN,UA: NORMAL
SL AMB POCT BLOOD,UA: NORMAL
SL AMB POCT CLARITY,UA: CLEAR
SL AMB POCT COLOR,UA: YELLOW
SL AMB POCT KETONES,UA: NORMAL
SL AMB POCT NITRITE,UA: NORMAL
SL AMB POCT PH,UA: 5
SL AMB POCT SPECIFIC GRAVITY,UA: 1.01
SL AMB POCT URINE PROTEIN: NORMAL
SL AMB POCT UROBILINOGEN: 0.2

## 2025-03-28 PROCEDURE — 81002 URINALYSIS NONAUTO W/O SCOPE: CPT | Performed by: UROLOGY

## 2025-03-28 PROCEDURE — 99215 OFFICE O/P EST HI 40 MIN: CPT | Performed by: UROLOGY

## 2025-03-28 PROCEDURE — 51798 US URINE CAPACITY MEASURE: CPT | Performed by: UROLOGY

## 2025-03-28 NOTE — TELEPHONE ENCOUNTER
1st attempt, called patient to confirm appointment scheduled for 5/28/2025 with Dr. Durán for the UDS results and left a voicemail message for patient to CB the office @ [phone] to confirm appointment.

## 2025-03-28 NOTE — TELEPHONE ENCOUNTER
1st attempt, called patient to confirm appointment scheduled for 5/28/2025 with Dr. Durán for the UDS results and left a voicemail message for patient to CB the office @ [phone] to confirm appointment.

## 2025-03-28 NOTE — TELEPHONE ENCOUNTER
Please advise on a UDS results appt with Dr. Durán after UDS appt on 5/14. Thank you!      Provider note:      Return for next available uds (at [place] or [place]) then see me a few weeks later to review results.

## 2025-03-31 NOTE — TELEPHONE ENCOUNTER
Patient returned call re: 5/28 appt with Dr Durán. Patient declined appt due to it being too early in the day. States that an afternoon appt time would be better.    Please review.    Call back [phone]

## 2025-04-03 NOTE — TELEPHONE ENCOUNTER
Spoke to pt to inform her that the next available appt was out into July. She decided to keep appt on 5/28 at 7:30am.

## 2025-05-14 ENCOUNTER — PROCEDURE VISIT (OUTPATIENT)
Dept: UROLOGY | Facility: MEDICAL CENTER | Age: 80
End: 2025-05-14
Payer: MEDICARE

## 2025-05-14 VITALS
BODY MASS INDEX: 33.44 KG/M2 | HEART RATE: 79 BPM | OXYGEN SATURATION: 95 % | WEIGHT: 177 LBS | DIASTOLIC BLOOD PRESSURE: 72 MMHG | SYSTOLIC BLOOD PRESSURE: 138 MMHG

## 2025-05-14 DIAGNOSIS — N32.81 DETRUSOR INSTABILITY: ICD-10-CM

## 2025-05-14 DIAGNOSIS — N39.41 URGE INCONTINENCE OF URINE: Primary | ICD-10-CM

## 2025-05-14 LAB
SL AMB  POCT GLUCOSE, UA: NEGATIVE
SL AMB LEUKOCYTE ESTERASE,UA: NEGATIVE
SL AMB POCT BILIRUBIN,UA: NEGATIVE
SL AMB POCT BLOOD,UA: ABNORMAL
SL AMB POCT CLARITY,UA: CLEAR
SL AMB POCT COLOR,UA: YELLOW
SL AMB POCT KETONES,UA: NEGATIVE
SL AMB POCT NITRITE,UA: NEGATIVE
SL AMB POCT PH,UA: 5
SL AMB POCT SPECIFIC GRAVITY,UA: 1.02
SL AMB POCT URINE PROTEIN: 3
SL AMB POCT UROBILINOGEN: NEGATIVE

## 2025-05-14 PROCEDURE — 51728 CYSTOMETROGRAM W/VP: CPT

## 2025-05-14 PROCEDURE — 51797 INTRAABDOMINAL PRESSURE TEST: CPT

## 2025-05-14 PROCEDURE — 81002 URINALYSIS NONAUTO W/O SCOPE: CPT

## 2025-05-14 PROCEDURE — 51784 ANAL/URINARY MUSCLE STUDY: CPT

## 2025-05-14 RX ORDER — PREDNISONE 2.5 MG/1
2.5 TABLET ORAL DAILY
COMMUNITY
Start: 2025-02-28

## 2025-05-14 NOTE — PROGRESS NOTES
"CC: \"I have a lot of leakage, coughing, sneezing, sleeping, doing nothing and sometimes with a strong urge.\"    Denies pain /burning with voiding    Uroflow:       Voided volume:      65  ml       Max flow rate:         23.5  ml/sec       Average flow rate:      0.7 ml/sec       PVR:    15 ml           CMG:       Position:  sitting          Fill sensation:    9 ml,    pdet     3 cmH2O       First urge:          25 ml,     pdet   4 cmH2O        Normal urge:     not verbalized       Must urge:         not verbalized    Permission to void:             41 ml,     pdet   12  cmH2O       Max pdet during void    12 cm H2O       Voided volume:    27.8 ml    Bladder stability:         unstable at  100 ml with leakage    Compliance:  normal        Sphincter function  Unable to fill enough to assess    EMG activity:       Normal during filling,        abnormal during voiding, however sensors were wet and coming off    Comments:   + DI with leak at 100 ml, fill then restarted and at 12 ml again had DI with leak.  Fill restarted again and after 7 ml again DI with leak . Fill restarted one more time and at 20 ml, had an urge to void and at 41 ml had DI with leak and void.     Unable to fill enough to assess for TERESA   + EMG with void, however sensors were wet and coming offf   Calculated PVR at end of test was 12 ml.    Urodynamics    Date/Time: 5/14/2025 2:00 PM    Performed by: Maite Le RN  Authorized by: Nelson Thacker MD    Universal Protocol:  procedure performed by consultantConsent: Verbal consent obtained. Written consent obtained  Risks and benefits: risks, benefits and alternatives were discussed  Consent given by: patient  Patient understanding: patient states understanding of the procedure being performed  Patient consent: the patient's understanding of the procedure matches consent given  Procedure consent: procedure consent matches procedure scheduled  Patient identity confirmed: verbally with patient  Procedure " - Urodynamics:  Procedure details: CMG and EMG      Voiding Pressure Study: Yes    Intra-abdominal Voiding Pressure Study: Yes    Post-procedure:     Patient tolerance: Patient tolerated procedure well with no immediate complications

## 2025-05-28 ENCOUNTER — OFFICE VISIT (OUTPATIENT)
Dept: UROLOGY | Facility: CLINIC | Age: 80
End: 2025-05-28
Payer: MEDICARE

## 2025-05-28 ENCOUNTER — TELEPHONE (OUTPATIENT)
Dept: UROLOGY | Facility: CLINIC | Age: 80
End: 2025-05-28

## 2025-05-28 VITALS
SYSTOLIC BLOOD PRESSURE: 148 MMHG | HEIGHT: 61 IN | HEART RATE: 80 BPM | DIASTOLIC BLOOD PRESSURE: 86 MMHG | OXYGEN SATURATION: 100 % | BODY MASS INDEX: 33.44 KG/M2

## 2025-05-28 DIAGNOSIS — N39.46 MIXED INCONTINENCE URGE AND STRESS: Primary | ICD-10-CM

## 2025-05-28 DIAGNOSIS — N39.41 URGE INCONTINENCE OF URINE: ICD-10-CM

## 2025-05-28 LAB
BACTERIA UR QL AUTO: ABNORMAL /HPF
BILIRUB UR QL STRIP: NEGATIVE
CLARITY UR: ABNORMAL
COLOR UR: YELLOW
GLUCOSE UR STRIP-MCNC: NEGATIVE MG/DL
HGB UR QL STRIP.AUTO: NEGATIVE
HYALINE CASTS #/AREA URNS LPF: ABNORMAL /LPF
KETONES UR STRIP-MCNC: NEGATIVE MG/DL
LEUKOCYTE ESTERASE UR QL STRIP: ABNORMAL
NITRITE UR QL STRIP: NEGATIVE
NON-SQ EPI CELLS URNS QL MICRO: ABNORMAL /HPF
PH UR STRIP.AUTO: 6 [PH]
POST-VOID RESIDUAL VOLUME, ML POC: 15 ML
PROT UR STRIP-MCNC: ABNORMAL MG/DL
RBC #/AREA URNS AUTO: ABNORMAL /HPF
SL AMB  POCT GLUCOSE, UA: NORMAL
SL AMB LEUKOCYTE ESTERASE,UA: 500
SL AMB POCT BILIRUBIN,UA: NORMAL
SL AMB POCT BLOOD,UA: 50
SL AMB POCT CLARITY,UA: CLEAR
SL AMB POCT COLOR,UA: YELLOW
SL AMB POCT KETONES,UA: NORMAL
SL AMB POCT NITRITE,UA: NORMAL
SL AMB POCT PH,UA: 5
SL AMB POCT SPECIFIC GRAVITY,UA: 1.02
SL AMB POCT URINE PROTEIN: 100
SL AMB POCT UROBILINOGEN: NORMAL
SP GR UR STRIP.AUTO: 1.03 (ref 1–1.03)
UROBILINOGEN UR STRIP-ACNC: <2 MG/DL
WBC #/AREA URNS AUTO: ABNORMAL /HPF
WBC CLUMPS # UR AUTO: PRESENT /UL

## 2025-05-28 PROCEDURE — 51798 US URINE CAPACITY MEASURE: CPT | Performed by: UROLOGY

## 2025-05-28 PROCEDURE — 81001 URINALYSIS AUTO W/SCOPE: CPT | Performed by: UROLOGY

## 2025-05-28 PROCEDURE — 87186 SC STD MICRODIL/AGAR DIL: CPT | Performed by: UROLOGY

## 2025-05-28 PROCEDURE — 87077 CULTURE AEROBIC IDENTIFY: CPT | Performed by: UROLOGY

## 2025-05-28 PROCEDURE — 81003 URINALYSIS AUTO W/O SCOPE: CPT | Performed by: UROLOGY

## 2025-05-28 PROCEDURE — 87086 URINE CULTURE/COLONY COUNT: CPT | Performed by: UROLOGY

## 2025-05-28 PROCEDURE — 99215 OFFICE O/P EST HI 40 MIN: CPT | Performed by: UROLOGY

## 2025-05-28 NOTE — TELEPHONE ENCOUNTER
BOTOX scheduled per Leeanna's notes (Leeanna botox next available (can be with one of my partners if I am booked too far out)  Should then see an AP about 1-2 months after)    Added to waitlist as pt requested sooner appt.

## 2025-05-29 NOTE — TELEPHONE ENCOUNTER
Patient called regarding botox appt. She has some questions and concerns regarding procedure and would like a call back to discuss.     PT CB: [phone]

## 2025-05-31 LAB
BACTERIA UR CULT: ABNORMAL
BACTERIA UR CULT: ABNORMAL

## 2025-06-02 ENCOUNTER — TELEPHONE (OUTPATIENT)
Dept: UROLOGY | Facility: CLINIC | Age: 80
End: 2025-06-02

## 2025-06-02 DIAGNOSIS — N39.0 URINARY TRACT INFECTION WITHOUT HEMATURIA, SITE UNSPECIFIED: Primary | ICD-10-CM

## 2025-06-02 RX ORDER — NITROFURANTOIN 25; 75 MG/1; MG/1
100 CAPSULE ORAL 2 TIMES DAILY
Qty: 10 CAPSULE | Refills: 0 | Status: SHIPPED | OUTPATIENT
Start: 2025-06-02 | End: 2025-06-07

## 2025-06-02 NOTE — TELEPHONE ENCOUNTER
Patient seen in clinic 5/28/2025    Urine culture was sent at that time    Results below:      5 days of Macrobid sent to the pharmacy    Can someone please call the patient and ask her to start the antibiotics?

## 2025-06-03 NOTE — TELEPHONE ENCOUNTER
Patient called in requesting if our office can help her find a pharmacy that delivers. She's been having a hard time walking. Please review.     Pt cb: [phone]

## 2025-07-30 ENCOUNTER — PROCEDURE VISIT (OUTPATIENT)
Dept: UROLOGY | Facility: CLINIC | Age: 80
End: 2025-07-30
Payer: MEDICARE

## 2025-07-30 VITALS — HEART RATE: 74 BPM | SYSTOLIC BLOOD PRESSURE: 134 MMHG | OXYGEN SATURATION: 95 % | DIASTOLIC BLOOD PRESSURE: 72 MMHG

## 2025-07-30 DIAGNOSIS — R82.90 ABNORMAL URINALYSIS: ICD-10-CM

## 2025-07-30 DIAGNOSIS — N32.81 OAB (OVERACTIVE BLADDER): Primary | ICD-10-CM

## 2025-07-30 LAB
BACTERIA UR QL AUTO: ABNORMAL /HPF
BILIRUB UR QL STRIP: NEGATIVE
CLARITY UR: CLEAR
COLOR UR: YELLOW
GLUCOSE UR STRIP-MCNC: NEGATIVE MG/DL
HGB UR QL STRIP.AUTO: NEGATIVE
KETONES UR STRIP-MCNC: NEGATIVE MG/DL
LEUKOCYTE ESTERASE UR QL STRIP: ABNORMAL
NITRITE UR QL STRIP: POSITIVE
NON-SQ EPI CELLS URNS QL MICRO: ABNORMAL /HPF
PH UR STRIP.AUTO: 6 [PH]
POST-VOID RESIDUAL VOLUME, ML POC: 15 ML
PROT UR STRIP-MCNC: ABNORMAL MG/DL
RBC #/AREA URNS AUTO: ABNORMAL /HPF
SL AMB  POCT GLUCOSE, UA: NEGATIVE
SL AMB LEUKOCYTE ESTERASE,UA: 500
SL AMB POCT BILIRUBIN,UA: NEGATIVE
SL AMB POCT BLOOD,UA: 50
SL AMB POCT CLARITY,UA: ABNORMAL
SL AMB POCT COLOR,UA: YELLOW
SL AMB POCT KETONES,UA: NEGATIVE
SL AMB POCT NITRITE,UA: POSITIVE
SL AMB POCT PH,UA: 5
SL AMB POCT SPECIFIC GRAVITY,UA: 1.01
SL AMB POCT URINE PROTEIN: 100
SL AMB POCT UROBILINOGEN: 0.2
SP GR UR STRIP.AUTO: 1.02 (ref 1–1.03)
UROBILINOGEN UR STRIP-ACNC: <2 MG/DL
WBC #/AREA URNS AUTO: ABNORMAL /HPF

## 2025-07-30 PROCEDURE — 81003 URINALYSIS AUTO W/O SCOPE: CPT | Performed by: UROLOGY

## 2025-07-30 PROCEDURE — 81001 URINALYSIS AUTO W/SCOPE: CPT | Performed by: UROLOGY

## 2025-07-30 PROCEDURE — 52287 CYSTOSCOPY CHEMODENERVATION: CPT | Performed by: UROLOGY

## 2025-07-30 PROCEDURE — 87086 URINE CULTURE/COLONY COUNT: CPT | Performed by: UROLOGY

## 2025-07-30 PROCEDURE — 51798 US URINE CAPACITY MEASURE: CPT | Performed by: UROLOGY

## 2025-07-30 PROCEDURE — 87186 SC STD MICRODIL/AGAR DIL: CPT | Performed by: UROLOGY

## 2025-07-30 PROCEDURE — 87077 CULTURE AEROBIC IDENTIFY: CPT | Performed by: UROLOGY

## 2025-07-30 RX ORDER — NITROFURANTOIN 25; 75 MG/1; MG/1
100 CAPSULE ORAL 2 TIMES DAILY
Qty: 10 CAPSULE | Refills: 0 | Status: SHIPPED | OUTPATIENT
Start: 2025-07-30 | End: 2025-08-04

## 2025-08-01 LAB — BACTERIA UR CULT: ABNORMAL

## (undated) DEVICE — TUBING SUCTION 5MM X 12 FT

## (undated) DEVICE — INTENDED FOR TISSUE SEPARATION, AND OTHER PROCEDURES THAT REQUIRE A SHARP SURGICAL BLADE TO PUNCTURE OR CUT.: Brand: BARD-PARKER ® CARBON RIB-BACK BLADES

## (undated) DEVICE — TRAY PAIN SUPPORT

## (undated) DEVICE — TOWEL SET X-RAY

## (undated) DEVICE — SPONGE SCRUB 4 PCT CHLORHEXIDINE

## (undated) DEVICE — DRESSING MEPILEX AG BORDER 4 X 4 IN

## (undated) DEVICE — GLOVE SRG BIOGEL 7.5

## (undated) DEVICE — PLASTIC ADHESIVE BANDAGE: Brand: CURITY

## (undated) DEVICE — NEEDLE 23G X 1 1/2 SAFETY-GLIDE THIN WALL

## (undated) DEVICE — PROGRAMMER INCEPTIV PATIENT

## (undated) DEVICE — RX-2™ COUDÉ®  EPIDURAL NEEDLE 14G TW X 4.0": Brand: EPIMED

## (undated) DEVICE — TIBURON SPLIT SHEET: Brand: CONVERTORS

## (undated) DEVICE — PAD GROUNDING DUAL ADULT

## (undated) DEVICE — ASTOUND STANDARD SURGICAL GOWN, XL: Brand: CONVERTORS

## (undated) DEVICE — DRAPE C-ARMOUR

## (undated) DEVICE — CHLORAPREP APPLICATOR TINTED 10.5ML ONE-STEP

## (undated) DEVICE — CHLORAPREP HI-LITE 26ML ORANGE

## (undated) DEVICE — IV SET EXT SM BORE CARESITE 8IN

## (undated) DEVICE — STERILE LATEX POWDER-FREE SURGICAL GLOVESWITH NITRILE COATING: Brand: PROTEXIS

## (undated) DEVICE — ELECTRODE BLADE MOD E-Z CLEAN 2.5IN 6.4CM -0012M

## (undated) DEVICE — DISPOSABLE EQUIPMENT COVER: Brand: SMALL TOWEL DRAPE

## (undated) DEVICE — PENCIL ELECTROSURG E-Z CLEAN -0035H

## (undated) DEVICE — PROGRAMMER EXTRNL WIRELESS NEURO STIM RS2

## (undated) DEVICE — GLOVE INDICATOR PI UNDERGLOVE SZ 7.5 BLUE

## (undated) DEVICE — BETHLEHEM UNIVERSAL MINOR GEN: Brand: CARDINAL HEALTH

## (undated) DEVICE — ANTIBACTERIAL VIOLET BRAIDED (POLYGLACTIN 910), SYNTHETIC ABSORBABLE SUTURE: Brand: COATED VICRYL

## (undated) DEVICE — DRAPE SHEET THREE QUARTER

## (undated) DEVICE — RECHARGER INCEPTIV

## (undated) DEVICE — TOWEL SURG XR DETECT GREEN STRL RFD

## (undated) DEVICE — 18G X 6"(152MM)PLASTIC HUBWITH WINGS, CALIBRATIONS: Brand: TUOHY EPIDURAL NEEDLE

## (undated) DEVICE — NEEDLE SPINAL 25G X 3.5 IN QUINCKE

## (undated) DEVICE — GLOVE RADIATION 7 1/2 PF

## (undated) DEVICE — DRAPE SURGIKIT SADDLE BAG

## (undated) DEVICE — SMALL NEEDLE COUNTER NEST

## (undated) DEVICE — SNAP KAP: Brand: UNBRANDED

## (undated) DEVICE — SYRINGE 5ML LL

## (undated) DEVICE — SUT CHROMIC 5-0 P-3 18 IN 687G

## (undated) DEVICE — 3M™ IOBAN™ 2 ANTIMICROBIAL INCISE DRAPE 6640EZ: Brand: IOBAN™ 2

## (undated) DEVICE — RADIOLOGY STERILE LABELS: Brand: CENTURION

## (undated) DEVICE — SKIN MARKER DUAL TIP WITH RULER CAP, FLEXIBLE RULER AND LABELS: Brand: DEVON

## (undated) DEVICE — SYRINGE EPI 8ML LUER SLIP LOSS OF RESISTANCE PLASTIC PERFIX

## (undated) DEVICE — EXOFIN PRECISION PEN HIGH VISCOSITY TOPICAL SKIN ADHESIVE: Brand: EXOFIN PRECISION PEN, 1G

## (undated) DEVICE — NEEDLE SPINAL 22G X 3.5IN  QUINCKE

## (undated) DEVICE — JACKSON TABLE FOAM POSITIONING KIT: Brand: CARDINAL HEALTH

## (undated) DEVICE — TRAY EPIDURAL PERIFIX 20GA X 3.5IN TUOHY 8ML

## (undated) DEVICE — SUT ETHILON 3-0 PSLX 30IN 1683H

## (undated) DEVICE — 3M™ TEGADERM™ TRANSPARENT FILM DRESSING FRAME STYLE, 1627, 4 IN X 10 IN (10 CM X 25 CM), 20/CT 4CT/CASE: Brand: 3M™ TEGADERM™

## (undated) DEVICE — NEEDLE BLUNT 18 G X 1 1/2 W FILTER

## (undated) DEVICE — BULB SYRINGE,IRRIGATION WITH PROTECTIVE CAP: Brand: DOVER

## (undated) DEVICE — WIPES BABY PAMPERS SENSITIVE 36/PK

## (undated) DEVICE — DRESSING BIOPATCH ANTIMICROBIOL 3/4 IN DISC

## (undated) DEVICE — DRESSING MEPILEX AG BORDER POST-OP 4 X 6 IN

## (undated) DEVICE — GLOVE SRG LF STRL BGL SKNSNS 7.5 PF

## (undated) DEVICE — SUT MONOCRYL 5-0 P-3 18 IN Y493G

## (undated) DEVICE — C-ARM: Brand: UNBRANDED